# Patient Record
Sex: MALE | Race: WHITE | NOT HISPANIC OR LATINO
[De-identification: names, ages, dates, MRNs, and addresses within clinical notes are randomized per-mention and may not be internally consistent; named-entity substitution may affect disease eponyms.]

---

## 2020-03-14 ENCOUNTER — TRANSCRIPTION ENCOUNTER (OUTPATIENT)
Age: 60
End: 2020-03-14

## 2022-12-08 ENCOUNTER — OFFICE (OUTPATIENT)
Dept: URBAN - METROPOLITAN AREA CLINIC 109 | Facility: CLINIC | Age: 62
Setting detail: OPHTHALMOLOGY
End: 2022-12-08
Payer: COMMERCIAL

## 2022-12-08 VITALS — HEIGHT: 60 IN

## 2022-12-08 DIAGNOSIS — H40.013: ICD-10-CM

## 2022-12-08 DIAGNOSIS — H02.834: ICD-10-CM

## 2022-12-08 DIAGNOSIS — H25.13: ICD-10-CM

## 2022-12-08 DIAGNOSIS — H16.223: ICD-10-CM

## 2022-12-08 DIAGNOSIS — H02.831: ICD-10-CM

## 2022-12-08 PROCEDURE — 92014 COMPRE OPH EXAM EST PT 1/>: CPT | Performed by: OPHTHALMOLOGY

## 2022-12-08 PROCEDURE — 92133 CPTRZD OPH DX IMG PST SGM ON: CPT | Performed by: OPHTHALMOLOGY

## 2022-12-08 PROCEDURE — 92083 EXTENDED VISUAL FIELD XM: CPT | Performed by: OPHTHALMOLOGY

## 2022-12-08 ASSESSMENT — AXIALLENGTH_DERIVED
OD_AL: 24.32
OS_AL: 24.62

## 2022-12-08 ASSESSMENT — SPHEQUIV_DERIVED
OD_SPHEQUIV: -3.625
OS_SPHEQUIV: -3.875

## 2022-12-08 ASSESSMENT — VISUAL ACUITY
OS_BCVA: 20/20
OD_BCVA: 20/20

## 2022-12-08 ASSESSMENT — REFRACTION_CURRENTRX
OS_ADD: +2.50
OD_SPHERE: -2.75
OS_VPRISM_DIRECTION: PROGS
OS_SPHERE: -3.50
OD_ADD: +2.50
OD_AXIS: 125
OS_CYLINDER: -0.50
OS_CYLINDER: -1.00
OS_SPHERE: -3.75
OS_AXIS: 160
OD_ADD: +2.00
OD_OVR_VA: 20/
OS_OVR_VA: 20/
OD_OVR_VA: 20/
OD_CYLINDER: -1.25
OD_AXIS: 120
OS_OVR_VA: 20/
OD_VPRISM_DIRECTION: PROGS
OS_AXIS: 175
OD_CYLINDER: -0.75
OD_SPHERE: -3.25
OS_ADD: +2.00

## 2022-12-08 ASSESSMENT — REFRACTION_AUTOREFRACTION
OS_SPHERE: -3.50
OD_AXIS: 121
OD_CYLINDER: -0.25
OD_SPHERE: -3.50
OS_CYLINDER: -0.75
OS_AXIS: 140

## 2022-12-08 ASSESSMENT — CONFRONTATIONAL VISUAL FIELD TEST (CVF)
OS_FINDINGS: FULL
OD_FINDINGS: FULL

## 2022-12-08 ASSESSMENT — TEAR BREAK UP TIME (TBUT)
OS_TBUT: 1+
OD_TBUT: 1+

## 2022-12-08 ASSESSMENT — LID EXAM ASSESSMENTS
OD_MEIBOMITIS: RLL RUL 1+
OS_BLEPHARITIS: LLL LUL 1+
OS_MEIBOMITIS: LLL LUL 1+
OD_BLEPHARITIS: RLL RUL 1+

## 2022-12-08 ASSESSMENT — KERATOMETRY
OS_AXISANGLE_DEGREES: 87
OS_K1POWER_DIOPTERS: 44.37
OD_AXISANGLE_DEGREES: 88
OD_K2POWER_DIOPTERS: 46.00
OS_K2POWER_DIOPTERS: 45.50
OD_K1POWER_DIOPTERS: 44.87

## 2022-12-08 ASSESSMENT — REFRACTION_MANIFEST
OD_ADD: +2.50
OS_ADD: +2.50

## 2022-12-08 ASSESSMENT — LID POSITION - DERMATOCHALASIS
OS_DERMATOCHALASIS: LUL 2+
OD_DERMATOCHALASIS: RUL 2+

## 2023-12-07 ENCOUNTER — OFFICE (OUTPATIENT)
Dept: URBAN - METROPOLITAN AREA CLINIC 109 | Facility: CLINIC | Age: 63
Setting detail: OPHTHALMOLOGY
End: 2023-12-07
Payer: MEDICARE

## 2023-12-07 DIAGNOSIS — H40.013: ICD-10-CM

## 2023-12-07 DIAGNOSIS — H16.223: ICD-10-CM

## 2023-12-07 DIAGNOSIS — H02.831: ICD-10-CM

## 2023-12-07 DIAGNOSIS — H25.13: ICD-10-CM

## 2023-12-07 DIAGNOSIS — H02.834: ICD-10-CM

## 2023-12-07 PROCEDURE — 92083 EXTENDED VISUAL FIELD XM: CPT | Performed by: OPHTHALMOLOGY

## 2023-12-07 PROCEDURE — 92133 CPTRZD OPH DX IMG PST SGM ON: CPT | Performed by: OPHTHALMOLOGY

## 2023-12-07 PROCEDURE — 92014 COMPRE OPH EXAM EST PT 1/>: CPT | Performed by: OPHTHALMOLOGY

## 2023-12-07 ASSESSMENT — REFRACTION_AUTOREFRACTION
OD_CYLINDER: -0.25
OS_SPHERE: -3.50
OD_SPHERE: -3.50
OS_AXIS: 140
OS_CYLINDER: -0.75
OD_AXIS: 121

## 2023-12-07 ASSESSMENT — LID EXAM ASSESSMENTS
OS_BLEPHARITIS: LLL LUL 1+
OD_BLEPHARITIS: RLL RUL 1+
OD_MEIBOMITIS: RLL RUL 1+
OS_MEIBOMITIS: LLL LUL 1+

## 2023-12-07 ASSESSMENT — TEAR BREAK UP TIME (TBUT)
OS_TBUT: 1+
OD_TBUT: 1+

## 2023-12-07 ASSESSMENT — SPHEQUIV_DERIVED
OS_SPHEQUIV: -3.875
OD_SPHEQUIV: -3.625

## 2023-12-07 ASSESSMENT — LID POSITION - DERMATOCHALASIS
OS_DERMATOCHALASIS: LUL 2+
OD_DERMATOCHALASIS: RUL 2+

## 2023-12-07 ASSESSMENT — REFRACTION_CURRENTRX
OD_ADD: +2.00
OD_VPRISM_DIRECTION: PROGS
OD_CYLINDER: -1.25
OS_SPHERE: -3.50
OS_ADD: +2.50
OS_OVR_VA: 20/
OD_AXIS: 125
OS_SPHERE: -3.75
OD_CYLINDER: -0.75
OD_SPHERE: -2.75
OD_OVR_VA: 20/
OS_AXIS: 160
OS_ADD: +2.00
OS_CYLINDER: -0.50
OS_OVR_VA: 20/
OD_OVR_VA: 20/
OD_SPHERE: -3.25
OD_ADD: +2.50
OD_AXIS: 120
OS_VPRISM_DIRECTION: PROGS
OS_CYLINDER: -1.00
OS_AXIS: 175

## 2023-12-07 ASSESSMENT — REFRACTION_MANIFEST
OD_ADD: +2.50
OS_ADD: +2.50

## 2024-01-01 ENCOUNTER — EMERGENCY (EMERGENCY)
Facility: HOSPITAL | Age: 64
LOS: 1 days | Discharge: HOME | End: 2024-01-01
Attending: EMERGENCY MEDICINE | Admitting: EMERGENCY MEDICINE
Payer: MEDICARE

## 2024-01-01 ENCOUNTER — INPATIENT (INPATIENT)
Facility: HOSPITAL | Age: 64
LOS: 4 days | DRG: 392 | End: 2024-08-24
Attending: SPECIALIST | Admitting: INTERNAL MEDICINE
Payer: MEDICARE

## 2024-01-01 ENCOUNTER — OUTPATIENT (OUTPATIENT)
Dept: OUTPATIENT SERVICES | Facility: HOSPITAL | Age: 64
LOS: 1 days | Discharge: HOME | End: 2024-01-01
Payer: MEDICARE

## 2024-01-01 VITALS
WEIGHT: 158.73 LBS | SYSTOLIC BLOOD PRESSURE: 121 MMHG | OXYGEN SATURATION: 98 % | DIASTOLIC BLOOD PRESSURE: 80 MMHG | RESPIRATION RATE: 20 BRPM | TEMPERATURE: 99 F | HEIGHT: 70 IN | HEART RATE: 116 BPM

## 2024-01-01 VITALS — HEART RATE: 80 BPM | OXYGEN SATURATION: 94 % | RESPIRATION RATE: 23 BRPM

## 2024-01-01 VITALS
HEIGHT: 70 IN | DIASTOLIC BLOOD PRESSURE: 70 MMHG | OXYGEN SATURATION: 96 % | HEART RATE: 95 BPM | RESPIRATION RATE: 18 BRPM | TEMPERATURE: 99 F | SYSTOLIC BLOOD PRESSURE: 101 MMHG

## 2024-01-01 VITALS
SYSTOLIC BLOOD PRESSURE: 105 MMHG | DIASTOLIC BLOOD PRESSURE: 70 MMHG | RESPIRATION RATE: 19 BRPM | OXYGEN SATURATION: 98 % | HEART RATE: 78 BPM | TEMPERATURE: 99 F

## 2024-01-01 DIAGNOSIS — C20 MALIGNANT NEOPLASM OF RECTUM: ICD-10-CM

## 2024-01-01 DIAGNOSIS — R19.7 DIARRHEA, UNSPECIFIED: ICD-10-CM

## 2024-01-01 DIAGNOSIS — R94.31 ABNORMAL ELECTROCARDIOGRAM [ECG] [EKG]: ICD-10-CM

## 2024-01-01 DIAGNOSIS — E87.1 HYPO-OSMOLALITY AND HYPONATREMIA: ICD-10-CM

## 2024-01-01 DIAGNOSIS — K52.9 NONINFECTIVE GASTROENTERITIS AND COLITIS, UNSPECIFIED: ICD-10-CM

## 2024-01-01 LAB
ADD ON TEST-SPECIMEN IN LAB: SIGNIFICANT CHANGE UP
ALBUMIN SERPL ELPH-MCNC: 1.3 G/DL — LOW (ref 3.3–5)
ALBUMIN SERPL ELPH-MCNC: 1.8 G/DL — LOW (ref 3.3–5)
ALBUMIN SERPL ELPH-MCNC: 2 G/DL — LOW (ref 3.3–5)
ALBUMIN SERPL ELPH-MCNC: 2.5 G/DL — LOW (ref 3.3–5)
ALBUMIN SERPL ELPH-MCNC: 2.9 G/DL — LOW (ref 3.3–5)
ALBUMIN SERPL ELPH-MCNC: 2.9 G/DL — LOW (ref 3.3–5)
ALP SERPL-CCNC: 46 U/L — SIGNIFICANT CHANGE UP (ref 30–120)
ALP SERPL-CCNC: 47 U/L — SIGNIFICANT CHANGE UP (ref 40–120)
ALP SERPL-CCNC: 58 U/L — SIGNIFICANT CHANGE UP (ref 40–120)
ALP SERPL-CCNC: 75 U/L — SIGNIFICANT CHANGE UP (ref 40–120)
ALP SERPL-CCNC: 80 U/L — SIGNIFICANT CHANGE UP (ref 40–120)
ALP SERPL-CCNC: 91 U/L — SIGNIFICANT CHANGE UP (ref 40–120)
ALT FLD-CCNC: 11 U/L — SIGNIFICANT CHANGE UP (ref 10–45)
ALT FLD-CCNC: 19 U/L — SIGNIFICANT CHANGE UP (ref 10–60)
ALT FLD-CCNC: 31 U/L — SIGNIFICANT CHANGE UP (ref 10–45)
ALT FLD-CCNC: 33 U/L — SIGNIFICANT CHANGE UP (ref 10–45)
ALT FLD-CCNC: 37 U/L — SIGNIFICANT CHANGE UP (ref 10–45)
ALT FLD-CCNC: 39 U/L — SIGNIFICANT CHANGE UP (ref 10–45)
ANION GAP SERPL CALC-SCNC: 11 MMOL/L — SIGNIFICANT CHANGE UP (ref 5–17)
ANION GAP SERPL CALC-SCNC: 16 MMOL/L — SIGNIFICANT CHANGE UP (ref 5–17)
ANION GAP SERPL CALC-SCNC: 16 MMOL/L — SIGNIFICANT CHANGE UP (ref 5–17)
ANION GAP SERPL CALC-SCNC: 17 MMOL/L — SIGNIFICANT CHANGE UP (ref 5–17)
ANION GAP SERPL CALC-SCNC: 20 MMOL/L — HIGH (ref 5–17)
ANION GAP SERPL CALC-SCNC: 22 MMOL/L — HIGH (ref 5–17)
ANION GAP SERPL CALC-SCNC: 23 MMOL/L — HIGH (ref 5–17)
ANION GAP SERPL CALC-SCNC: 23 MMOL/L — HIGH (ref 5–17)
ANION GAP SERPL CALC-SCNC: 9 MMOL/L — SIGNIFICANT CHANGE UP (ref 5–17)
ANISOCYTOSIS BLD QL: SLIGHT — SIGNIFICANT CHANGE UP
ANISOCYTOSIS BLD QL: SLIGHT — SIGNIFICANT CHANGE UP
APPEARANCE UR: ABNORMAL
APPEARANCE UR: CLEAR — SIGNIFICANT CHANGE UP
APPEARANCE UR: CLEAR — SIGNIFICANT CHANGE UP
APTT BLD: 76.1 SEC — HIGH (ref 24.5–35.6)
AST SERPL-CCNC: 12 U/L — SIGNIFICANT CHANGE UP (ref 10–40)
AST SERPL-CCNC: 20 U/L — SIGNIFICANT CHANGE UP (ref 10–40)
AST SERPL-CCNC: 30 U/L — SIGNIFICANT CHANGE UP (ref 10–40)
AST SERPL-CCNC: 36 U/L — SIGNIFICANT CHANGE UP (ref 10–40)
AST SERPL-CCNC: 42 U/L — HIGH (ref 10–40)
AST SERPL-CCNC: 64 U/L — HIGH (ref 10–40)
B-OH-BUTYR SERPL-SCNC: 0.7 MMOL/L — HIGH
BACTERIA # UR AUTO: ABNORMAL /HPF
BACTERIA # UR AUTO: NEGATIVE /HPF — SIGNIFICANT CHANGE UP
BACTERIA # UR AUTO: NEGATIVE /HPF — SIGNIFICANT CHANGE UP
BASE EXCESS BLDV CALC-SCNC: -21.3 MMOL/L — LOW (ref -2–3)
BASOPHILS # BLD AUTO: 0 K/UL — SIGNIFICANT CHANGE UP (ref 0–0.2)
BASOPHILS # BLD AUTO: 0.02 K/UL — SIGNIFICANT CHANGE UP (ref 0–0.2)
BASOPHILS # BLD AUTO: 0.02 K/UL — SIGNIFICANT CHANGE UP (ref 0–0.2)
BASOPHILS # BLD AUTO: 0.03 K/UL — SIGNIFICANT CHANGE UP (ref 0–0.2)
BASOPHILS NFR BLD AUTO: 0 % — SIGNIFICANT CHANGE UP (ref 0–2)
BASOPHILS NFR BLD AUTO: 0.2 % — SIGNIFICANT CHANGE UP (ref 0–2)
BASOPHILS NFR BLD AUTO: 0.2 % — SIGNIFICANT CHANGE UP (ref 0–2)
BASOPHILS NFR BLD AUTO: 0.5 % — SIGNIFICANT CHANGE UP (ref 0–2)
BILIRUB SERPL-MCNC: 0.7 MG/DL — SIGNIFICANT CHANGE UP (ref 0.2–1.2)
BILIRUB SERPL-MCNC: 0.8 MG/DL — SIGNIFICANT CHANGE UP (ref 0.2–1.2)
BILIRUB SERPL-MCNC: 0.9 MG/DL — SIGNIFICANT CHANGE UP (ref 0.2–1.2)
BILIRUB SERPL-MCNC: 1.6 MG/DL — HIGH (ref 0.2–1.2)
BILIRUB SERPL-MCNC: 1.6 MG/DL — HIGH (ref 0.2–1.2)
BILIRUB SERPL-MCNC: 2.1 MG/DL — HIGH (ref 0.2–1.2)
BILIRUB UR-MCNC: ABNORMAL
BILIRUB UR-MCNC: ABNORMAL
BILIRUB UR-MCNC: NEGATIVE — SIGNIFICANT CHANGE UP
BLD GP AB SCN SERPL QL: NEGATIVE — SIGNIFICANT CHANGE UP
BUN SERPL-MCNC: 24 MG/DL — HIGH (ref 7–23)
BUN SERPL-MCNC: 33 MG/DL — HIGH (ref 7–23)
BUN SERPL-MCNC: 34 MG/DL — HIGH (ref 7–23)
BUN SERPL-MCNC: 34 MG/DL — HIGH (ref 7–23)
BUN SERPL-MCNC: 38 MG/DL — HIGH (ref 7–23)
BUN SERPL-MCNC: 41 MG/DL — HIGH (ref 7–23)
BUN SERPL-MCNC: 48 MG/DL — HIGH (ref 7–23)
BUN SERPL-MCNC: 50 MG/DL — HIGH (ref 7–23)
BUN SERPL-MCNC: 51 MG/DL — HIGH (ref 7–23)
BURR CELLS BLD QL SMEAR: PRESENT — SIGNIFICANT CHANGE UP
C DIFF GDH STL QL: NEGATIVE — SIGNIFICANT CHANGE UP
C DIFF GDH STL QL: SIGNIFICANT CHANGE UP
CALCIUM SERPL-MCNC: 7.4 MG/DL — LOW (ref 8.4–10.5)
CALCIUM SERPL-MCNC: 7.5 MG/DL — LOW (ref 8.4–10.5)
CALCIUM SERPL-MCNC: 7.5 MG/DL — LOW (ref 8.4–10.5)
CALCIUM SERPL-MCNC: 7.7 MG/DL — LOW (ref 8.4–10.5)
CALCIUM SERPL-MCNC: 7.9 MG/DL — LOW (ref 8.4–10.5)
CALCIUM SERPL-MCNC: 8.3 MG/DL — LOW (ref 8.4–10.5)
CALCIUM SERPL-MCNC: 8.3 MG/DL — LOW (ref 8.4–10.5)
CALCIUM SERPL-MCNC: 8.7 MG/DL — SIGNIFICANT CHANGE UP (ref 8.4–10.5)
CALCIUM SERPL-MCNC: 9 MG/DL — SIGNIFICANT CHANGE UP (ref 8.4–10.5)
CAST: 18 /LPF — HIGH (ref 0–4)
CAST: >63 /LPF — HIGH (ref 0–4)
CHLORIDE SERPL-SCNC: 100 MMOL/L — SIGNIFICANT CHANGE UP (ref 96–108)
CHLORIDE SERPL-SCNC: 101 MMOL/L — SIGNIFICANT CHANGE UP (ref 96–108)
CHLORIDE SERPL-SCNC: 95 MMOL/L — LOW (ref 96–108)
CHLORIDE SERPL-SCNC: 95 MMOL/L — LOW (ref 96–108)
CHLORIDE SERPL-SCNC: 96 MMOL/L — SIGNIFICANT CHANGE UP (ref 96–108)
CHLORIDE SERPL-SCNC: 97 MMOL/L — SIGNIFICANT CHANGE UP (ref 96–108)
CHLORIDE SERPL-SCNC: 97 MMOL/L — SIGNIFICANT CHANGE UP (ref 96–108)
CHLORIDE SERPL-SCNC: 98 MMOL/L — SIGNIFICANT CHANGE UP (ref 96–108)
CHLORIDE SERPL-SCNC: 99 MMOL/L — SIGNIFICANT CHANGE UP (ref 96–108)
CK MB CFR SERPL CALC: 2.9 NG/ML — SIGNIFICANT CHANGE UP (ref 0–6.7)
CK MB CFR SERPL CALC: 8.3 NG/ML — HIGH (ref 0–6.7)
CO2 BLDV-SCNC: 9 MMOL/L — CRITICAL LOW (ref 22–26)
CO2 SERPL-SCNC: 10 MMOL/L — CRITICAL LOW (ref 22–31)
CO2 SERPL-SCNC: 11 MMOL/L — LOW (ref 22–31)
CO2 SERPL-SCNC: 13 MMOL/L — LOW (ref 22–31)
CO2 SERPL-SCNC: 16 MMOL/L — LOW (ref 22–31)
CO2 SERPL-SCNC: 18 MMOL/L — LOW (ref 22–31)
CO2 SERPL-SCNC: 18 MMOL/L — LOW (ref 22–31)
CO2 SERPL-SCNC: 20 MMOL/L — LOW (ref 22–31)
CO2 SERPL-SCNC: 25 MMOL/L — SIGNIFICANT CHANGE UP (ref 22–31)
CO2 SERPL-SCNC: <10 MMOL/L — CRITICAL LOW (ref 22–31)
COLOR SPEC: SIGNIFICANT CHANGE UP
CREAT ?TM UR-MCNC: 197 MG/DL — SIGNIFICANT CHANGE UP
CREAT SERPL-MCNC: 1.16 MG/DL — SIGNIFICANT CHANGE UP (ref 0.5–1.3)
CREAT SERPL-MCNC: 1.2 MG/DL — SIGNIFICANT CHANGE UP (ref 0.5–1.3)
CREAT SERPL-MCNC: 1.21 MG/DL — SIGNIFICANT CHANGE UP (ref 0.5–1.3)
CREAT SERPL-MCNC: 1.65 MG/DL — HIGH (ref 0.5–1.3)
CREAT SERPL-MCNC: 1.73 MG/DL — HIGH (ref 0.5–1.3)
CREAT SERPL-MCNC: 1.77 MG/DL — HIGH (ref 0.5–1.3)
CREAT SERPL-MCNC: 2.59 MG/DL — HIGH (ref 0.5–1.3)
CREAT SERPL-MCNC: 2.59 MG/DL — HIGH (ref 0.5–1.3)
CREAT SERPL-MCNC: 2.66 MG/DL — HIGH (ref 0.5–1.3)
DACRYOCYTES BLD QL SMEAR: SLIGHT — SIGNIFICANT CHANGE UP
DIFF PNL FLD: ABNORMAL
DIFF PNL FLD: NEGATIVE — SIGNIFICANT CHANGE UP
DIFF PNL FLD: NEGATIVE — SIGNIFICANT CHANGE UP
E FAECIUM DNA BLD POS QL NAA+NON-PROBE: SIGNIFICANT CHANGE UP
EGFR: 26 ML/MIN/1.73M2 — LOW
EGFR: 42 ML/MIN/1.73M2 — LOW
EGFR: 43 ML/MIN/1.73M2 — LOW
EGFR: 46 ML/MIN/1.73M2 — LOW
EGFR: 66 ML/MIN/1.73M2 — SIGNIFICANT CHANGE UP
EGFR: 67 ML/MIN/1.73M2 — SIGNIFICANT CHANGE UP
EGFR: 69 ML/MIN/1.73M2 — SIGNIFICANT CHANGE UP
ELLIPTOCYTES BLD QL SMEAR: SLIGHT — SIGNIFICANT CHANGE UP
ELLIPTOCYTES BLD QL SMEAR: SLIGHT — SIGNIFICANT CHANGE UP
EOSINOPHIL # BLD AUTO: 0 K/UL — SIGNIFICANT CHANGE UP (ref 0–0.5)
EOSINOPHIL # BLD AUTO: 0.01 K/UL — SIGNIFICANT CHANGE UP (ref 0–0.5)
EOSINOPHIL NFR BLD AUTO: 0 % — SIGNIFICANT CHANGE UP (ref 0–6)
EOSINOPHIL NFR BLD AUTO: 0.1 % — SIGNIFICANT CHANGE UP (ref 0–6)
EPI CELLS # UR: PRESENT
FLUAV AG NPH QL: SIGNIFICANT CHANGE UP
FLUBV AG NPH QL: SIGNIFICANT CHANGE UP
GAS PNL BLDA: SIGNIFICANT CHANGE UP
GAS PNL BLDV: SIGNIFICANT CHANGE UP
GI PCR PANEL: SIGNIFICANT CHANGE UP
GIANT PLATELETS BLD QL SMEAR: PRESENT — SIGNIFICANT CHANGE UP
GLUCOSE BLDC GLUCOMTR-MCNC: 154 MG/DL — HIGH (ref 70–99)
GLUCOSE BLDC GLUCOMTR-MCNC: 163 MG/DL — HIGH (ref 70–99)
GLUCOSE SERPL-MCNC: 118 MG/DL — HIGH (ref 70–99)
GLUCOSE SERPL-MCNC: 134 MG/DL — HIGH (ref 70–99)
GLUCOSE SERPL-MCNC: 138 MG/DL — HIGH (ref 70–99)
GLUCOSE SERPL-MCNC: 156 MG/DL — HIGH (ref 70–99)
GLUCOSE SERPL-MCNC: 159 MG/DL — HIGH (ref 70–99)
GLUCOSE SERPL-MCNC: 171 MG/DL — HIGH (ref 70–99)
GLUCOSE SERPL-MCNC: 185 MG/DL — HIGH (ref 70–99)
GLUCOSE SERPL-MCNC: 187 MG/DL — HIGH (ref 70–99)
GLUCOSE SERPL-MCNC: 256 MG/DL — HIGH (ref 70–99)
GLUCOSE UR QL: NEGATIVE MG/DL — SIGNIFICANT CHANGE UP
GRAM STN FLD: ABNORMAL
HCO3 BLDV-SCNC: 8 MMOL/L — CRITICAL LOW (ref 22–29)
HCT VFR BLD CALC: 33.1 % — LOW (ref 39–50)
HCT VFR BLD CALC: 38.9 % — LOW (ref 39–50)
HCT VFR BLD CALC: 40.1 % — SIGNIFICANT CHANGE UP (ref 39–50)
HCT VFR BLD CALC: 42.2 % — SIGNIFICANT CHANGE UP (ref 39–50)
HCT VFR BLD CALC: 46.1 % — SIGNIFICANT CHANGE UP (ref 39–50)
HCT VFR BLD CALC: 47 % — SIGNIFICANT CHANGE UP (ref 39–50)
HCT VFR BLD CALC: 47.2 % — SIGNIFICANT CHANGE UP (ref 39–50)
HCT VFR BLD CALC: 49.2 % — SIGNIFICANT CHANGE UP (ref 39–50)
HGB BLD-MCNC: 10.3 G/DL — LOW (ref 13–17)
HGB BLD-MCNC: 12 G/DL — LOW (ref 13–17)
HGB BLD-MCNC: 12.8 G/DL — LOW (ref 13–17)
HGB BLD-MCNC: 13.9 G/DL — SIGNIFICANT CHANGE UP (ref 13–17)
HGB BLD-MCNC: 14 G/DL — SIGNIFICANT CHANGE UP (ref 13–17)
HGB BLD-MCNC: 14.9 G/DL — SIGNIFICANT CHANGE UP (ref 13–17)
HGB BLD-MCNC: 15 G/DL — SIGNIFICANT CHANGE UP (ref 13–17)
HGB BLD-MCNC: 15 G/DL — SIGNIFICANT CHANGE UP (ref 13–17)
HYALINE CASTS # UR AUTO: PRESENT
HYPOCHROMIA BLD QL: SLIGHT — SIGNIFICANT CHANGE UP
IMM GRANULOCYTES NFR BLD AUTO: 1 % — HIGH (ref 0–0.9)
IMM GRANULOCYTES NFR BLD AUTO: 1.8 % — HIGH (ref 0–0.9)
IMM GRANULOCYTES NFR BLD AUTO: 3.4 % — HIGH (ref 0–0.9)
INR BLD: 5.91 RATIO — CRITICAL HIGH (ref 0.85–1.18)
KETONES UR-MCNC: ABNORMAL MG/DL
KETONES UR-MCNC: ABNORMAL MG/DL
KETONES UR-MCNC: NEGATIVE MG/DL — SIGNIFICANT CHANGE UP
LACTATE SERPL-SCNC: 1.3 MMOL/L — SIGNIFICANT CHANGE UP (ref 0.5–2)
LEUKOCYTE ESTERASE UR-ACNC: ABNORMAL
LEUKOCYTE ESTERASE UR-ACNC: NEGATIVE — SIGNIFICANT CHANGE UP
LEUKOCYTE ESTERASE UR-ACNC: NEGATIVE — SIGNIFICANT CHANGE UP
LG PLATELETS BLD QL AUTO: SLIGHT — SIGNIFICANT CHANGE UP
LG PLATELETS BLD QL AUTO: SLIGHT — SIGNIFICANT CHANGE UP
LIDOCAIN IGE QN: 37 U/L — SIGNIFICANT CHANGE UP (ref 16–77)
LYMPHOCYTES # BLD AUTO: 0.67 K/UL — LOW (ref 1–3.3)
LYMPHOCYTES # BLD AUTO: 0.7 K/UL — LOW (ref 1–3.3)
LYMPHOCYTES # BLD AUTO: 0.82 K/UL — LOW (ref 1–3.3)
LYMPHOCYTES # BLD AUTO: 0.97 K/UL — LOW (ref 1–3.3)
LYMPHOCYTES # BLD AUTO: 1.19 K/UL — SIGNIFICANT CHANGE UP (ref 1–3.3)
LYMPHOCYTES # BLD AUTO: 14.9 % — SIGNIFICANT CHANGE UP (ref 13–44)
LYMPHOCYTES # BLD AUTO: 2.38 K/UL — SIGNIFICANT CHANGE UP (ref 1–3.3)
LYMPHOCYTES # BLD AUTO: 26 % — SIGNIFICANT CHANGE UP (ref 13–44)
LYMPHOCYTES # BLD AUTO: 49.5 % — HIGH (ref 13–44)
LYMPHOCYTES # BLD AUTO: 8.8 % — LOW (ref 13–44)
LYMPHOCYTES # BLD AUTO: 9 % — LOW (ref 13–44)
LYMPHOCYTES # BLD AUTO: 9 % — LOW (ref 13–44)
MAGNESIUM SERPL-MCNC: 2.4 MG/DL — SIGNIFICANT CHANGE UP (ref 1.6–2.6)
MAGNESIUM SERPL-MCNC: 3.8 MG/DL — HIGH (ref 1.6–2.6)
MAGNESIUM SERPL-MCNC: 3.9 MG/DL — HIGH (ref 1.6–2.6)
MAGNESIUM SERPL-MCNC: 4.5 MG/DL — HIGH (ref 1.6–2.6)
MANUAL SMEAR VERIFICATION: SIGNIFICANT CHANGE UP
MCHC RBC-ENTMCNC: 22.1 PG — LOW (ref 27–34)
MCHC RBC-ENTMCNC: 22.1 PG — LOW (ref 27–34)
MCHC RBC-ENTMCNC: 22.2 PG — LOW (ref 27–34)
MCHC RBC-ENTMCNC: 22.5 PG — LOW (ref 27–34)
MCHC RBC-ENTMCNC: 22.7 PG — LOW (ref 27–34)
MCHC RBC-ENTMCNC: 22.8 PG — LOW (ref 27–34)
MCHC RBC-ENTMCNC: 22.9 PG — LOW (ref 27–34)
MCHC RBC-ENTMCNC: 23.1 PG — LOW (ref 27–34)
MCHC RBC-ENTMCNC: 29.9 GM/DL — LOW (ref 32–36)
MCHC RBC-ENTMCNC: 30.2 GM/DL — LOW (ref 32–36)
MCHC RBC-ENTMCNC: 30.5 GM/DL — LOW (ref 32–36)
MCHC RBC-ENTMCNC: 31.1 GM/DL — LOW (ref 32–36)
MCHC RBC-ENTMCNC: 31.6 GM/DL — LOW (ref 32–36)
MCHC RBC-ENTMCNC: 31.9 GM/DL — LOW (ref 32–36)
MCHC RBC-ENTMCNC: 32.9 GM/DL — SIGNIFICANT CHANGE UP (ref 32–36)
MCHC RBC-ENTMCNC: 33.2 GM/DL — SIGNIFICANT CHANGE UP (ref 32–36)
MCV RBC AUTO: 68.4 FL — LOW (ref 80–100)
MCV RBC AUTO: 69.3 FL — LOW (ref 80–100)
MCV RBC AUTO: 69.6 FL — LOW (ref 80–100)
MCV RBC AUTO: 70.1 FL — LOW (ref 80–100)
MCV RBC AUTO: 72.5 FL — LOW (ref 80–100)
MCV RBC AUTO: 74.4 FL — LOW (ref 80–100)
MCV RBC AUTO: 74.6 FL — LOW (ref 80–100)
MCV RBC AUTO: 76.7 FL — LOW (ref 80–100)
METAMYELOCYTES # FLD: 10 % — HIGH (ref 0–0)
METAMYELOCYTES # FLD: 11.7 % — HIGH (ref 0–0)
METHOD TYPE: SIGNIFICANT CHANGE UP
MICROCYTES BLD QL: SLIGHT — SIGNIFICANT CHANGE UP
MICROCYTES BLD QL: SLIGHT — SIGNIFICANT CHANGE UP
MONOCYTES # BLD AUTO: 0.19 K/UL — SIGNIFICANT CHANGE UP (ref 0–0.9)
MONOCYTES # BLD AUTO: 0.21 K/UL — SIGNIFICANT CHANGE UP (ref 0–0.9)
MONOCYTES # BLD AUTO: 0.64 K/UL — SIGNIFICANT CHANGE UP (ref 0–0.9)
MONOCYTES # BLD AUTO: 0.74 K/UL — SIGNIFICANT CHANGE UP (ref 0–0.9)
MONOCYTES # BLD AUTO: 0.96 K/UL — HIGH (ref 0–0.9)
MONOCYTES # BLD AUTO: 1.47 K/UL — HIGH (ref 0–0.9)
MONOCYTES NFR BLD AUTO: 10.3 % — SIGNIFICANT CHANGE UP (ref 2–14)
MONOCYTES NFR BLD AUTO: 15.5 % — HIGH (ref 2–14)
MONOCYTES NFR BLD AUTO: 19 % — HIGH (ref 2–14)
MONOCYTES NFR BLD AUTO: 2.9 % — SIGNIFICANT CHANGE UP (ref 2–14)
MONOCYTES NFR BLD AUTO: 4.8 % — SIGNIFICANT CHANGE UP (ref 2–14)
MONOCYTES NFR BLD AUTO: 8 % — SIGNIFICANT CHANGE UP (ref 2–14)
MYELOCYTES NFR BLD: 1 % — HIGH (ref 0–0)
MYELOCYTES NFR BLD: 10.7 % — HIGH (ref 0–0)
NEUTROPHILS # BLD AUTO: 0.6 K/UL — LOW (ref 1.8–7.4)
NEUTROPHILS # BLD AUTO: 1.6 K/UL — LOW (ref 1.8–7.4)
NEUTROPHILS # BLD AUTO: 10.93 K/UL — HIGH (ref 1.8–7.4)
NEUTROPHILS # BLD AUTO: 4.64 K/UL — SIGNIFICANT CHANGE UP (ref 1.8–7.4)
NEUTROPHILS # BLD AUTO: 5.22 K/UL — SIGNIFICANT CHANGE UP (ref 1.8–7.4)
NEUTROPHILS # BLD AUTO: 7.45 K/UL — HIGH (ref 1.8–7.4)
NEUTROPHILS NFR BLD AUTO: 1.9 % — LOW (ref 43–77)
NEUTROPHILS NFR BLD AUTO: 44 % — SIGNIFICANT CHANGE UP (ref 43–77)
NEUTROPHILS NFR BLD AUTO: 52 % — SIGNIFICANT CHANGE UP (ref 43–77)
NEUTROPHILS NFR BLD AUTO: 79.7 % — HIGH (ref 43–77)
NEUTROPHILS NFR BLD AUTO: 79.9 % — HIGH (ref 43–77)
NEUTROPHILS NFR BLD AUTO: 82.5 % — HIGH (ref 43–77)
NEUTS BAND # BLD: 10 % — HIGH (ref 0–8)
NEUTS BAND # BLD: 10.7 % — HIGH (ref 0–8)
NEUTS BAND # BLD: 16 % — HIGH (ref 0–8)
NITRITE UR-MCNC: NEGATIVE — SIGNIFICANT CHANGE UP
NITRITE UR-MCNC: NEGATIVE — SIGNIFICANT CHANGE UP
NITRITE UR-MCNC: POSITIVE
NRBC # BLD: 0 /100 WBCS — SIGNIFICANT CHANGE UP (ref 0–0)
NRBC # BLD: 2 /100 WBCS — HIGH (ref 0–0)
NRBC # BLD: SIGNIFICANT CHANGE UP /100 WBCS (ref 0–0)
NT-PROBNP SERPL-SCNC: 464 PG/ML — HIGH (ref 0–300)
NT-PROBNP SERPL-SCNC: 628 PG/ML — HIGH (ref 0–300)
OSMOLALITY UR: 993 MOS/KG — HIGH (ref 300–900)
OVALOCYTES BLD QL SMEAR: SLIGHT — SIGNIFICANT CHANGE UP
PCO2 BLDV: 29 MMHG — LOW (ref 42–55)
PH BLDV: 7.05 — CRITICAL LOW (ref 7.32–7.43)
PH UR: 5.5 — SIGNIFICANT CHANGE UP (ref 5–8)
PH UR: 6 — SIGNIFICANT CHANGE UP (ref 5–8)
PH UR: 6 — SIGNIFICANT CHANGE UP (ref 5–8)
PHOSPHATE SERPL-MCNC: 11 MG/DL — HIGH (ref 2.5–4.5)
PHOSPHATE SERPL-MCNC: 11.6 MG/DL — HIGH (ref 2.5–4.5)
PHOSPHATE SERPL-MCNC: 5.7 MG/DL — HIGH (ref 2.5–4.5)
PHOSPHATE SERPL-MCNC: 8 MG/DL — HIGH (ref 2.5–4.5)
PLAT MORPH BLD: NORMAL — SIGNIFICANT CHANGE UP
PLATELET # BLD AUTO: 303 K/UL — SIGNIFICANT CHANGE UP (ref 150–400)
PLATELET # BLD AUTO: 306 K/UL — SIGNIFICANT CHANGE UP (ref 150–400)
PLATELET # BLD AUTO: 349 K/UL — SIGNIFICANT CHANGE UP (ref 150–400)
PLATELET # BLD AUTO: 432 K/UL — HIGH (ref 150–400)
PLATELET # BLD AUTO: 459 K/UL — HIGH (ref 150–400)
PLATELET # BLD AUTO: 468 K/UL — HIGH (ref 150–400)
PLATELET # BLD AUTO: 474 K/UL — HIGH (ref 150–400)
PLATELET # BLD AUTO: 494 K/UL — HIGH (ref 150–400)
PO2 BLDV: 245 MMHG — HIGH (ref 25–45)
POIKILOCYTOSIS BLD QL AUTO: SLIGHT — SIGNIFICANT CHANGE UP
POIKILOCYTOSIS BLD QL AUTO: SLIGHT — SIGNIFICANT CHANGE UP
POTASSIUM SERPL-MCNC: 3.7 MMOL/L — SIGNIFICANT CHANGE UP (ref 3.5–5.3)
POTASSIUM SERPL-MCNC: 3.7 MMOL/L — SIGNIFICANT CHANGE UP (ref 3.5–5.3)
POTASSIUM SERPL-MCNC: 4.1 MMOL/L — SIGNIFICANT CHANGE UP (ref 3.5–5.3)
POTASSIUM SERPL-MCNC: 4.1 MMOL/L — SIGNIFICANT CHANGE UP (ref 3.5–5.3)
POTASSIUM SERPL-MCNC: 4.4 MMOL/L — SIGNIFICANT CHANGE UP (ref 3.5–5.3)
POTASSIUM SERPL-MCNC: 4.6 MMOL/L — SIGNIFICANT CHANGE UP (ref 3.5–5.3)
POTASSIUM SERPL-MCNC: 4.7 MMOL/L — SIGNIFICANT CHANGE UP (ref 3.5–5.3)
POTASSIUM SERPL-MCNC: 5 MMOL/L — SIGNIFICANT CHANGE UP (ref 3.5–5.3)
POTASSIUM SERPL-MCNC: 6.7 MMOL/L — CRITICAL HIGH (ref 3.5–5.3)
POTASSIUM SERPL-SCNC: 3.7 MMOL/L — SIGNIFICANT CHANGE UP (ref 3.5–5.3)
POTASSIUM SERPL-SCNC: 3.7 MMOL/L — SIGNIFICANT CHANGE UP (ref 3.5–5.3)
POTASSIUM SERPL-SCNC: 4.1 MMOL/L — SIGNIFICANT CHANGE UP (ref 3.5–5.3)
POTASSIUM SERPL-SCNC: 4.1 MMOL/L — SIGNIFICANT CHANGE UP (ref 3.5–5.3)
POTASSIUM SERPL-SCNC: 4.4 MMOL/L — SIGNIFICANT CHANGE UP (ref 3.5–5.3)
POTASSIUM SERPL-SCNC: 4.6 MMOL/L — SIGNIFICANT CHANGE UP (ref 3.5–5.3)
POTASSIUM SERPL-SCNC: 4.7 MMOL/L — SIGNIFICANT CHANGE UP (ref 3.5–5.3)
POTASSIUM SERPL-SCNC: 5 MMOL/L — SIGNIFICANT CHANGE UP (ref 3.5–5.3)
POTASSIUM SERPL-SCNC: 6.7 MMOL/L — CRITICAL HIGH (ref 3.5–5.3)
POTASSIUM UR-SCNC: 52 MMOL/L — SIGNIFICANT CHANGE UP
PROMYELOCYTES # FLD: 1 % — HIGH (ref 0–0)
PROT ?TM UR-MCNC: 40 MG/DL — HIGH (ref 0–12)
PROT SERPL-MCNC: 2.9 G/DL — LOW (ref 6–8.3)
PROT SERPL-MCNC: 3.3 G/DL — LOW (ref 6–8.3)
PROT SERPL-MCNC: 4.9 G/DL — LOW (ref 6–8.3)
PROT SERPL-MCNC: 5.4 G/DL — LOW (ref 6–8.3)
PROT SERPL-MCNC: 5.6 G/DL — LOW (ref 6–8.3)
PROT SERPL-MCNC: 5.9 G/DL — LOW (ref 6–8.3)
PROT UR-MCNC: 30 MG/DL
PROT/CREAT UR-RTO: 0.2 RATIO — SIGNIFICANT CHANGE UP (ref 0–0.2)
PROTHROM AB SERPL-ACNC: 61.7 SEC — HIGH (ref 9.5–13)
RBC # BLD: 4.45 M/UL — SIGNIFICANT CHANGE UP (ref 4.2–5.8)
RBC # BLD: 5.23 M/UL — SIGNIFICANT CHANGE UP (ref 4.2–5.8)
RBC # BLD: 5.61 M/UL — SIGNIFICANT CHANGE UP (ref 4.2–5.8)
RBC # BLD: 6.17 M/UL — HIGH (ref 4.2–5.8)
RBC # BLD: 6.18 M/UL — HIGH (ref 4.2–5.8)
RBC # BLD: 6.73 M/UL — HIGH (ref 4.2–5.8)
RBC # BLD: 6.75 M/UL — HIGH (ref 4.2–5.8)
RBC # BLD: 6.79 M/UL — HIGH (ref 4.2–5.8)
RBC # FLD: 20.4 % — HIGH (ref 10.3–14.5)
RBC # FLD: 20.9 % — HIGH (ref 10.3–14.5)
RBC # FLD: 21 % — HIGH (ref 10.3–14.5)
RBC # FLD: 23 % — HIGH (ref 10.3–14.5)
RBC # FLD: 23.2 % — HIGH (ref 10.3–14.5)
RBC # FLD: 23.8 % — HIGH (ref 10.3–14.5)
RBC # FLD: 23.9 % — HIGH (ref 10.3–14.5)
RBC # FLD: 24 % — HIGH (ref 10.3–14.5)
RBC BLD AUTO: ABNORMAL
RBC CASTS # UR COMP ASSIST: 0 /HPF — SIGNIFICANT CHANGE UP (ref 0–4)
RBC CASTS # UR COMP ASSIST: 2 /HPF — SIGNIFICANT CHANGE UP (ref 0–4)
RBC CASTS # UR COMP ASSIST: 45 /HPF — HIGH (ref 0–4)
REVIEW: SIGNIFICANT CHANGE UP
REVIEW: SIGNIFICANT CHANGE UP
RH IG SCN BLD-IMP: POSITIVE — SIGNIFICANT CHANGE UP
RSV RNA NPH QL NAA+NON-PROBE: SIGNIFICANT CHANGE UP
SAO2 % BLDV: 95.4 % — HIGH (ref 67–88)
SARS-COV-2 RNA SPEC QL NAA+PROBE: SIGNIFICANT CHANGE UP
SMUDGE CELLS # BLD: PRESENT — SIGNIFICANT CHANGE UP
SODIUM SERPL-SCNC: 126 MMOL/L — LOW (ref 135–145)
SODIUM SERPL-SCNC: 128 MMOL/L — LOW (ref 135–145)
SODIUM SERPL-SCNC: 131 MMOL/L — LOW (ref 135–145)
SODIUM SERPL-SCNC: 132 MMOL/L — LOW (ref 135–145)
SODIUM SERPL-SCNC: 133 MMOL/L — LOW (ref 135–145)
SODIUM SERPL-SCNC: 134 MMOL/L — LOW (ref 135–145)
SODIUM SERPL-SCNC: 134 MMOL/L — LOW (ref 135–145)
SODIUM UR-SCNC: <5 MMOL/L — SIGNIFICANT CHANGE UP
SP GR SPEC: 1.04 — HIGH (ref 1–1.03)
SP GR SPEC: >1.03 — HIGH (ref 1–1.03)
SP GR SPEC: >1.03 — HIGH (ref 1–1.03)
SPECIMEN SOURCE: SIGNIFICANT CHANGE UP
SPECIMEN SOURCE: SIGNIFICANT CHANGE UP
SQUAMOUS # UR AUTO: 1 /HPF — SIGNIFICANT CHANGE UP (ref 0–5)
SQUAMOUS # UR AUTO: 8 /HPF — HIGH (ref 0–5)
SURGICAL PATHOLOGY STUDY: SIGNIFICANT CHANGE UP
TOXIC GRANULES BLD QL SMEAR: PRESENT — SIGNIFICANT CHANGE UP
TROPONIN I, HIGH SENSITIVITY RESULT: 5.5 NG/L — SIGNIFICANT CHANGE UP
TROPONIN T, HIGH SENSITIVITY RESULT: 23 NG/L — SIGNIFICANT CHANGE UP (ref 0–51)
TROPONIN T, HIGH SENSITIVITY RESULT: 62 NG/L — HIGH (ref 0–51)
UROBILINOGEN FLD QL: 0.2 MG/DL — SIGNIFICANT CHANGE UP (ref 0.2–1)
UROBILINOGEN FLD QL: 0.2 MG/DL — SIGNIFICANT CHANGE UP (ref 0.2–1)
UROBILINOGEN FLD QL: 1 MG/DL — SIGNIFICANT CHANGE UP (ref 0.2–1)
UUN UR-MCNC: 1789 MG/DL — SIGNIFICANT CHANGE UP
VARIANT LYMPHS # BLD: 4 % — SIGNIFICANT CHANGE UP (ref 0–6)
WBC # BLD: 13.24 K/UL — HIGH (ref 3.8–10.5)
WBC # BLD: 2.58 K/UL — LOW (ref 3.8–10.5)
WBC # BLD: 4.8 K/UL — SIGNIFICANT CHANGE UP (ref 3.8–10.5)
WBC # BLD: 5.79 K/UL — SIGNIFICANT CHANGE UP (ref 3.8–10.5)
WBC # BLD: 6.25 K/UL — SIGNIFICANT CHANGE UP (ref 3.8–10.5)
WBC # BLD: 6.53 K/UL — SIGNIFICANT CHANGE UP (ref 3.8–10.5)
WBC # BLD: 7.74 K/UL — SIGNIFICANT CHANGE UP (ref 3.8–10.5)
WBC # BLD: 9.34 K/UL — SIGNIFICANT CHANGE UP (ref 3.8–10.5)
WBC # FLD AUTO: 13.24 K/UL — HIGH (ref 3.8–10.5)
WBC # FLD AUTO: 2.58 K/UL — LOW (ref 3.8–10.5)
WBC # FLD AUTO: 4.8 K/UL — SIGNIFICANT CHANGE UP (ref 3.8–10.5)
WBC # FLD AUTO: 5.79 K/UL — SIGNIFICANT CHANGE UP (ref 3.8–10.5)
WBC # FLD AUTO: 6.25 K/UL — SIGNIFICANT CHANGE UP (ref 3.8–10.5)
WBC # FLD AUTO: 6.53 K/UL — SIGNIFICANT CHANGE UP (ref 3.8–10.5)
WBC # FLD AUTO: 7.74 K/UL — SIGNIFICANT CHANGE UP (ref 3.8–10.5)
WBC # FLD AUTO: 9.34 K/UL — SIGNIFICANT CHANGE UP (ref 3.8–10.5)
WBC UR QL: 0 /HPF — SIGNIFICANT CHANGE UP (ref 0–5)
WBC UR QL: 1 /HPF — SIGNIFICANT CHANGE UP (ref 0–5)
WBC UR QL: 3 /HPF — SIGNIFICANT CHANGE UP (ref 0–5)

## 2024-01-01 PROCEDURE — 99291 CRITICAL CARE FIRST HOUR: CPT | Mod: GC,25

## 2024-01-01 PROCEDURE — 71275 CT ANGIOGRAPHY CHEST: CPT | Mod: 26

## 2024-01-01 PROCEDURE — 87150 DNA/RNA AMPLIFIED PROBE: CPT

## 2024-01-01 PROCEDURE — 87324 CLOSTRIDIUM AG IA: CPT

## 2024-01-01 PROCEDURE — 84540 ASSAY OF URINE/UREA-N: CPT

## 2024-01-01 PROCEDURE — 81001 URINALYSIS AUTO W/SCOPE: CPT

## 2024-01-01 PROCEDURE — 43753 TX GASTRO INTUB W/ASP: CPT | Mod: 59,GC

## 2024-01-01 PROCEDURE — 84484 ASSAY OF TROPONIN QUANT: CPT

## 2024-01-01 PROCEDURE — 97161 PT EVAL LOW COMPLEX 20 MIN: CPT

## 2024-01-01 PROCEDURE — 99284 EMERGENCY DEPT VISIT MOD MDM: CPT

## 2024-01-01 PROCEDURE — 84100 ASSAY OF PHOSPHORUS: CPT

## 2024-01-01 PROCEDURE — 94002 VENT MGMT INPAT INIT DAY: CPT

## 2024-01-01 PROCEDURE — 87637 SARSCOV2&INF A&B&RSV AMP PRB: CPT

## 2024-01-01 PROCEDURE — 36556 INSERT NON-TUNNEL CV CATH: CPT | Mod: GC

## 2024-01-01 PROCEDURE — 36415 COLL VENOUS BLD VENIPUNCTURE: CPT

## 2024-01-01 PROCEDURE — 85027 COMPLETE CBC AUTOMATED: CPT

## 2024-01-01 PROCEDURE — 74177 CT ABD & PELVIS W/CONTRAST: CPT | Mod: 26

## 2024-01-01 PROCEDURE — 36620 INSERTION CATHETER ARTERY: CPT | Mod: GC

## 2024-01-01 PROCEDURE — 83735 ASSAY OF MAGNESIUM: CPT

## 2024-01-01 PROCEDURE — 88321 CONSLTJ&REPRT SLD PREP ELSWR: CPT

## 2024-01-01 PROCEDURE — 96374 THER/PROPH/DIAG INJ IV PUSH: CPT

## 2024-01-01 PROCEDURE — 85014 HEMATOCRIT: CPT

## 2024-01-01 PROCEDURE — 99285 EMERGENCY DEPT VISIT HI MDM: CPT

## 2024-01-01 PROCEDURE — 80048 BASIC METABOLIC PNL TOTAL CA: CPT

## 2024-01-01 PROCEDURE — 82435 ASSAY OF BLOOD CHLORIDE: CPT

## 2024-01-01 PROCEDURE — 97116 GAIT TRAINING THERAPY: CPT

## 2024-01-01 PROCEDURE — 71045 X-RAY EXAM CHEST 1 VIEW: CPT | Mod: 26

## 2024-01-01 PROCEDURE — 82947 ASSAY GLUCOSE BLOOD QUANT: CPT

## 2024-01-01 PROCEDURE — 82570 ASSAY OF URINE CREATININE: CPT

## 2024-01-01 PROCEDURE — 76604 US EXAM CHEST: CPT | Mod: 26,GC

## 2024-01-01 PROCEDURE — 87040 BLOOD CULTURE FOR BACTERIA: CPT

## 2024-01-01 PROCEDURE — 85025 COMPLETE CBC W/AUTO DIFF WBC: CPT

## 2024-01-01 PROCEDURE — 99223 1ST HOSP IP/OBS HIGH 75: CPT

## 2024-01-01 PROCEDURE — 93308 TTE F-UP OR LMTD: CPT | Mod: 26,GC

## 2024-01-01 PROCEDURE — 87449 NOS EACH ORGANISM AG IA: CPT

## 2024-01-01 PROCEDURE — 87507 IADNA-DNA/RNA PROBE TQ 12-25: CPT

## 2024-01-01 PROCEDURE — 87077 CULTURE AEROBIC IDENTIFY: CPT

## 2024-01-01 PROCEDURE — 84156 ASSAY OF PROTEIN URINE: CPT

## 2024-01-01 PROCEDURE — 84145 PROCALCITONIN (PCT): CPT

## 2024-01-01 PROCEDURE — 84300 ASSAY OF URINE SODIUM: CPT

## 2024-01-01 PROCEDURE — 96375 TX/PRO/DX INJ NEW DRUG ADDON: CPT

## 2024-01-01 PROCEDURE — P9045: CPT

## 2024-01-01 PROCEDURE — 74177 CT ABD & PELVIS W/CONTRAST: CPT | Mod: MC

## 2024-01-01 PROCEDURE — 99284 EMERGENCY DEPT VISIT MOD MDM: CPT | Mod: 25

## 2024-01-01 PROCEDURE — 82803 BLOOD GASES ANY COMBINATION: CPT

## 2024-01-01 PROCEDURE — 86900 BLOOD TYPING SEROLOGIC ABO: CPT

## 2024-01-01 PROCEDURE — 87086 URINE CULTURE/COLONY COUNT: CPT

## 2024-01-01 PROCEDURE — 36680 INSERT NEEDLE BONE CAVITY: CPT | Mod: GC

## 2024-01-01 PROCEDURE — 85730 THROMBOPLASTIN TIME PARTIAL: CPT

## 2024-01-01 PROCEDURE — 71045 X-RAY EXAM CHEST 1 VIEW: CPT

## 2024-01-01 PROCEDURE — 83880 ASSAY OF NATRIURETIC PEPTIDE: CPT

## 2024-01-01 PROCEDURE — 84132 ASSAY OF SERUM POTASSIUM: CPT

## 2024-01-01 PROCEDURE — 83690 ASSAY OF LIPASE: CPT

## 2024-01-01 PROCEDURE — 96361 HYDRATE IV INFUSION ADD-ON: CPT

## 2024-01-01 PROCEDURE — 82962 GLUCOSE BLOOD TEST: CPT

## 2024-01-01 PROCEDURE — 83935 ASSAY OF URINE OSMOLALITY: CPT

## 2024-01-01 PROCEDURE — 84133 ASSAY OF URINE POTASSIUM: CPT

## 2024-01-01 PROCEDURE — 97110 THERAPEUTIC EXERCISES: CPT

## 2024-01-01 PROCEDURE — 86850 RBC ANTIBODY SCREEN: CPT

## 2024-01-01 PROCEDURE — 86901 BLOOD TYPING SEROLOGIC RH(D): CPT

## 2024-01-01 PROCEDURE — 85610 PROTHROMBIN TIME: CPT

## 2024-01-01 PROCEDURE — 83605 ASSAY OF LACTIC ACID: CPT

## 2024-01-01 PROCEDURE — 82553 CREATINE MB FRACTION: CPT

## 2024-01-01 PROCEDURE — 85018 HEMOGLOBIN: CPT

## 2024-01-01 PROCEDURE — 80053 COMPREHEN METABOLIC PANEL: CPT

## 2024-01-01 PROCEDURE — 87186 SC STD MICRODIL/AGAR DIL: CPT

## 2024-01-01 PROCEDURE — 82010 KETONE BODYS QUAN: CPT

## 2024-01-01 PROCEDURE — 82330 ASSAY OF CALCIUM: CPT

## 2024-01-01 PROCEDURE — 71275 CT ANGIOGRAPHY CHEST: CPT | Mod: MC

## 2024-01-01 PROCEDURE — 84295 ASSAY OF SERUM SODIUM: CPT

## 2024-01-01 RX ORDER — ACETAMINOPHEN 325 MG/1
1000 TABLET ORAL ONCE
Refills: 0 | Status: COMPLETED | OUTPATIENT
Start: 2024-01-01 | End: 2024-01-01

## 2024-01-01 RX ORDER — ONDANSETRON 2 MG/ML
4 INJECTION, SOLUTION INTRAMUSCULAR; INTRAVENOUS ONCE
Refills: 0 | Status: COMPLETED | OUTPATIENT
Start: 2024-01-01 | End: 2024-01-01

## 2024-01-01 RX ORDER — CALCIUM GLUCONATE 61(648) MG
2 TABLET ORAL ONCE
Refills: 0 | Status: COMPLETED | OUTPATIENT
Start: 2024-01-01 | End: 2024-01-01

## 2024-01-01 RX ORDER — FUROSEMIDE 40 MG
20 TABLET ORAL ONCE
Refills: 0 | Status: DISCONTINUED | OUTPATIENT
Start: 2024-01-01 | End: 2024-01-01

## 2024-01-01 RX ORDER — ASPIRIN 81 MG
1 TABLET, DELAYED RELEASE (ENTERIC COATED) ORAL
Refills: 0 | DISCHARGE

## 2024-01-01 RX ORDER — ALBUMIN (HUMAN) 5 G/20ML
50 SOLUTION INTRAVENOUS ONCE
Refills: 0 | Status: DISCONTINUED | OUTPATIENT
Start: 2024-01-01 | End: 2024-01-01

## 2024-01-01 RX ORDER — PANTOPRAZOLE SODIUM 40 MG
40 TABLET, DELAYED RELEASE (ENTERIC COATED) ORAL DAILY
Refills: 0 | Status: DISCONTINUED | OUTPATIENT
Start: 2024-01-01 | End: 2024-01-01

## 2024-01-01 RX ORDER — SODIUM CHLORIDE 9 MG/ML
1000 INJECTION INTRAMUSCULAR; INTRAVENOUS; SUBCUTANEOUS
Refills: 0 | Status: DISCONTINUED | OUTPATIENT
Start: 2024-01-01 | End: 2024-01-01

## 2024-01-01 RX ORDER — SODIUM CHLORIDE 9 MG/ML
500 INJECTION INTRAMUSCULAR; INTRAVENOUS; SUBCUTANEOUS ONCE
Refills: 0 | Status: COMPLETED | OUTPATIENT
Start: 2024-01-01 | End: 2024-01-01

## 2024-01-01 RX ORDER — MAGNESIUM, ALUMINUM HYDROXIDE 200-225/5
30 SUSPENSION, ORAL (FINAL DOSE FORM) ORAL ONCE
Refills: 0 | Status: COMPLETED | OUTPATIENT
Start: 2024-01-01 | End: 2024-01-01

## 2024-01-01 RX ORDER — ACETAMINOPHEN 500 MG
1000 TABLET ORAL ONCE
Refills: 0 | Status: COMPLETED | OUTPATIENT
Start: 2024-01-01 | End: 2024-01-01

## 2024-01-01 RX ORDER — ASPIRIN 81 MG
81 TABLET, DELAYED RELEASE (ENTERIC COATED) ORAL DAILY
Refills: 0 | Status: DISCONTINUED | OUTPATIENT
Start: 2024-01-01 | End: 2024-01-01

## 2024-01-01 RX ORDER — OXYCODONE HYDROCHLORIDE 5 MG/1
5 TABLET ORAL EVERY 8 HOURS
Refills: 0 | Status: DISCONTINUED | OUTPATIENT
Start: 2024-01-01 | End: 2024-01-01

## 2024-01-01 RX ORDER — VASOPRESSIN 20 [USP'U]/ML
0.04 INJECTION INTRAVENOUS
Qty: 40 | Refills: 0 | Status: DISCONTINUED | OUTPATIENT
Start: 2024-01-01 | End: 2024-01-01

## 2024-01-01 RX ORDER — FOLIC ACID/MULTIVIT,IRON,MINER 0.4MG-18MG
1 TABLET,CHEWABLE ORAL
Refills: 0 | DISCHARGE

## 2024-01-01 RX ORDER — MAGNESIUM, ALUMINUM HYDROXIDE 200-225/5
30 SUSPENSION, ORAL (FINAL DOSE FORM) ORAL EVERY 4 HOURS
Refills: 0 | Status: DISCONTINUED | OUTPATIENT
Start: 2024-01-01 | End: 2024-01-01

## 2024-01-01 RX ORDER — FINASTERIDE 1 MG/1
1 TABLET, FILM COATED ORAL
Refills: 0 | DISCHARGE

## 2024-01-01 RX ORDER — PANTOPRAZOLE SODIUM 40 MG
40 TABLET, DELAYED RELEASE (ENTERIC COATED) ORAL ONCE
Refills: 0 | Status: COMPLETED | OUTPATIENT
Start: 2024-01-01 | End: 2024-01-01

## 2024-01-01 RX ORDER — DEXMEDETOMIDINE HYDROCHLORIDE IN 0.9% SODIUM CHLORIDE 4 UG/ML
0.4 INJECTION INTRAVENOUS
Qty: 200 | Refills: 0 | Status: DISCONTINUED | OUTPATIENT
Start: 2024-01-01 | End: 2024-01-01

## 2024-01-01 RX ORDER — CAPECITABINE 500 MG/1
2 TABLET, FILM COATED ORAL
Refills: 0 | DISCHARGE

## 2024-01-01 RX ORDER — PANTOPRAZOLE SODIUM 40 MG
40 TABLET, DELAYED RELEASE (ENTERIC COATED) ORAL EVERY 12 HOURS
Refills: 0 | Status: DISCONTINUED | OUTPATIENT
Start: 2024-01-01 | End: 2024-01-01

## 2024-01-01 RX ORDER — PHENYLEPHRINE HYDROCHLORIDE 10 MG/ML
0.1 INJECTION INTRAVENOUS
Qty: 160 | Refills: 0 | Status: DISCONTINUED | OUTPATIENT
Start: 2024-01-01 | End: 2024-01-01

## 2024-01-01 RX ORDER — GABAPENTIN 100 MG
300 CAPSULE ORAL AT BEDTIME
Refills: 0 | Status: DISCONTINUED | OUTPATIENT
Start: 2024-01-01 | End: 2024-01-01

## 2024-01-01 RX ORDER — INSULIN REGULAR, HUMAN 100/ML (3)
5 INSULIN PEN (ML) SUBCUTANEOUS ONCE
Refills: 0 | Status: COMPLETED | OUTPATIENT
Start: 2024-01-01 | End: 2024-01-01

## 2024-01-01 RX ORDER — DEXTROSE 15 G/33 G
50 GEL IN PACKET (GRAM) ORAL ONCE
Refills: 0 | Status: COMPLETED | OUTPATIENT
Start: 2024-01-01 | End: 2024-01-01

## 2024-01-01 RX ORDER — ONDANSETRON 2 MG/ML
4 INJECTION, SOLUTION INTRAMUSCULAR; INTRAVENOUS EVERY 6 HOURS
Refills: 0 | Status: DISCONTINUED | OUTPATIENT
Start: 2024-01-01 | End: 2024-01-01

## 2024-01-01 RX ORDER — SODIUM CHLORIDE 9 MG/ML
1000 INJECTION INTRAMUSCULAR; INTRAVENOUS; SUBCUTANEOUS ONCE
Refills: 0 | Status: COMPLETED | OUTPATIENT
Start: 2024-01-01 | End: 2024-01-01

## 2024-01-01 RX ORDER — BACTERIOSTATIC SODIUM CHLORIDE 0.9 %
1000 VIAL (ML) INJECTION ONCE
Refills: 0 | Status: COMPLETED | OUTPATIENT
Start: 2024-01-01 | End: 2024-01-01

## 2024-01-01 RX ORDER — CHLORHEXIDINE GLUCONATE 40 MG/ML
1 SOLUTION TOPICAL DAILY
Refills: 0 | Status: DISCONTINUED | OUTPATIENT
Start: 2024-01-01 | End: 2024-01-01

## 2024-01-01 RX ORDER — SUCRALFATE 1 G/10ML
1 SUSPENSION ORAL ONCE
Refills: 0 | Status: COMPLETED | OUTPATIENT
Start: 2024-01-01 | End: 2024-01-01

## 2024-01-01 RX ORDER — EZETIMIBE 10 MG/1
1 TABLET ORAL
Refills: 0 | DISCHARGE

## 2024-01-01 RX ORDER — SODIUM BICARBONATE 84 MG/ML
50 INJECTION, SOLUTION INTRAVENOUS ONCE
Refills: 0 | Status: COMPLETED | OUTPATIENT
Start: 2024-01-01 | End: 2024-01-01

## 2024-01-01 RX ORDER — OXYCODONE HYDROCHLORIDE 15 MG/1
150 TABLET ORAL ONCE
Refills: 0 | Status: COMPLETED | OUTPATIENT
Start: 2024-01-01 | End: 2024-01-01

## 2024-01-01 RX ORDER — PITAVASTATIN MAGNESIUM 2 MG/1
1 TABLET, FILM COATED ORAL
Refills: 0 | DISCHARGE

## 2024-01-01 RX ORDER — ROPIVACAINE IN 0.9% SOD CHL/PF 0.1 %
0.05 PLASTIC BAG, INJECTION (ML) EPIDURAL
Qty: 16 | Refills: 0 | Status: DISCONTINUED | OUTPATIENT
Start: 2024-01-01 | End: 2024-01-01

## 2024-01-01 RX ORDER — CYANOCOBALAMIN (VITAMIN B-12) 500MCG/0.1
1 GEL (ML) NASAL
Refills: 0 | DISCHARGE

## 2024-01-01 RX ORDER — SUCRALFATE 1 G/10ML
1 SUSPENSION ORAL EVERY 6 HOURS
Refills: 0 | Status: DISCONTINUED | OUTPATIENT
Start: 2024-01-01 | End: 2024-01-01

## 2024-01-01 RX ORDER — SODIUM BICARBONATE 84 MG/ML
1300 INJECTION, SOLUTION INTRAVENOUS
Refills: 0 | Status: DISCONTINUED | OUTPATIENT
Start: 2024-01-01 | End: 2024-01-01

## 2024-01-01 RX ORDER — SODIUM BICARBONATE 84 MG/ML
0.16 INJECTION, SOLUTION INTRAVENOUS
Qty: 75 | Refills: 0 | Status: DISCONTINUED | OUTPATIENT
Start: 2024-01-01 | End: 2024-01-01

## 2024-01-01 RX ORDER — PIPERACILLIN SODIUM AND TAZOBACTAM SODIUM 3; .375 G/15ML; G/15ML
3.38 INJECTION, POWDER, FOR SOLUTION INTRAVENOUS ONCE
Refills: 0 | Status: DISCONTINUED | OUTPATIENT
Start: 2024-01-01 | End: 2024-01-01

## 2024-01-01 RX ORDER — OXYCODONE HYDROCHLORIDE 15 MG/1
0.5 TABLET ORAL
Qty: 450 | Refills: 0 | Status: DISCONTINUED | OUTPATIENT
Start: 2024-01-01 | End: 2024-01-01

## 2024-01-01 RX ORDER — CHLORPROMAZINE HCL 50 MG
25 TABLET ORAL EVERY 8 HOURS
Refills: 0 | Status: DISCONTINUED | OUTPATIENT
Start: 2024-01-01 | End: 2024-01-01

## 2024-01-01 RX ORDER — ONDANSETRON 2 MG/ML
1 INJECTION, SOLUTION INTRAMUSCULAR; INTRAVENOUS
Refills: 0 | DISCHARGE

## 2024-01-01 RX ORDER — SODIUM BICARBONATE 84 MG/ML
50 INJECTION, SOLUTION INTRAVENOUS ONCE
Refills: 0 | Status: DISCONTINUED | OUTPATIENT
Start: 2024-01-01 | End: 2024-01-01

## 2024-01-01 RX ORDER — ONDANSETRON HCL/PF 4 MG/2 ML
4 VIAL (ML) INJECTION ONCE
Refills: 0 | Status: COMPLETED | OUTPATIENT
Start: 2024-01-01 | End: 2024-01-01

## 2024-01-01 RX ORDER — FAMOTIDINE 40 MG/1
20 TABLET, FILM COATED ORAL ONCE
Refills: 0 | Status: COMPLETED | OUTPATIENT
Start: 2024-01-01 | End: 2024-01-01

## 2024-01-01 RX ORDER — PIPERACILLIN SODIUM AND TAZOBACTAM SODIUM 3; .375 G/15ML; G/15ML
3.38 INJECTION, POWDER, FOR SOLUTION INTRAVENOUS ONCE
Refills: 0 | Status: COMPLETED | OUTPATIENT
Start: 2024-01-01 | End: 2024-01-01

## 2024-01-01 RX ORDER — ONDANSETRON 2 MG/ML
4 INJECTION, SOLUTION INTRAMUSCULAR; INTRAVENOUS THREE TIMES A DAY
Refills: 0 | Status: DISCONTINUED | OUTPATIENT
Start: 2024-01-01 | End: 2024-01-01

## 2024-01-01 RX ORDER — PIPERACILLIN SODIUM AND TAZOBACTAM SODIUM 3; .375 G/15ML; G/15ML
3.38 INJECTION, POWDER, FOR SOLUTION INTRAVENOUS EVERY 8 HOURS
Refills: 0 | Status: DISCONTINUED | OUTPATIENT
Start: 2024-01-01 | End: 2024-01-01

## 2024-01-01 RX ORDER — FINASTERIDE 1 MG/1
5 TABLET, FILM COATED ORAL DAILY
Refills: 0 | Status: DISCONTINUED | OUTPATIENT
Start: 2024-01-01 | End: 2024-01-01

## 2024-01-01 RX ORDER — METOCLOPRAMIDE HCL 5 MG
10 TABLET ORAL ONCE
Refills: 0 | Status: COMPLETED | OUTPATIENT
Start: 2024-01-01 | End: 2024-01-01

## 2024-01-01 RX ORDER — CHLORHEXIDINE GLUCONATE 40 MG/ML
15 SOLUTION TOPICAL EVERY 12 HOURS
Refills: 0 | Status: DISCONTINUED | OUTPATIENT
Start: 2024-01-01 | End: 2024-01-01

## 2024-01-01 RX ORDER — FENTANYL CITRATE 50 UG/ML
1 INJECTION INTRAMUSCULAR; INTRAVENOUS
Qty: 5000 | Refills: 0 | Status: DISCONTINUED | OUTPATIENT
Start: 2024-01-01 | End: 2024-01-01

## 2024-01-01 RX ORDER — CAPECITABINE 500 MG/1
3 TABLET, FILM COATED ORAL
Refills: 0 | DISCHARGE

## 2024-01-01 RX ORDER — ROPIVACAINE IN 0.9% SOD CHL/PF 0.1 %
0.05 PLASTIC BAG, INJECTION (ML) EPIDURAL
Qty: 8 | Refills: 0 | Status: DISCONTINUED | OUTPATIENT
Start: 2024-01-01 | End: 2024-01-01

## 2024-01-01 RX ORDER — OXYCODONE HYDROCHLORIDE 15 MG/1
1 TABLET ORAL
Qty: 450 | Refills: 0 | Status: DISCONTINUED | OUTPATIENT
Start: 2024-01-01 | End: 2024-01-01

## 2024-01-01 RX ORDER — SODIUM BICARBONATE 84 MG/ML
0.32 INJECTION, SOLUTION INTRAVENOUS
Qty: 150 | Refills: 0 | Status: DISCONTINUED | OUTPATIENT
Start: 2024-01-01 | End: 2024-01-01

## 2024-01-01 RX ORDER — LORAZEPAM 4 MG/ML
4 INJECTION INTRAMUSCULAR; INTRAVENOUS ONCE
Refills: 0 | Status: DISCONTINUED | OUTPATIENT
Start: 2024-01-01 | End: 2024-01-01

## 2024-01-01 RX ORDER — DIPHENHYDRAMINE HYDROCHLORIDE AND LIDOCAINE HYDROCHLORIDE AND ALUMINUM HYDROXIDE AND MAGNESIUM HYDRO
10 KIT
Refills: 0 | Status: DISCONTINUED | OUTPATIENT
Start: 2024-01-01 | End: 2024-01-01

## 2024-01-01 RX ORDER — IPRATROPIUM BROMIDE AND ALBUTEROL SULFATE .5; 3 MG/3ML; MG/3ML
3 SOLUTION RESPIRATORY (INHALATION) ONCE
Refills: 0 | Status: DISCONTINUED | OUTPATIENT
Start: 2024-01-01 | End: 2024-01-01

## 2024-01-01 RX ORDER — PHENYLEPHRINE HYDROCHLORIDE 10 MG/ML
0.1 INJECTION INTRAVENOUS
Qty: 40 | Refills: 0 | Status: DISCONTINUED | OUTPATIENT
Start: 2024-01-01 | End: 2024-01-01

## 2024-01-01 RX ORDER — ROPIVACAINE IN 0.9% SOD CHL/PF 0.1 %
0.05 PLASTIC BAG, INJECTION (ML) EPIDURAL
Qty: 32 | Refills: 0 | Status: DISCONTINUED | OUTPATIENT
Start: 2024-01-01 | End: 2024-01-01

## 2024-01-01 RX ORDER — BACLOFEN 0.5 MG/ML
5 INJECTION INTRATHECAL ONCE
Refills: 0 | Status: COMPLETED | OUTPATIENT
Start: 2024-01-01 | End: 2024-01-01

## 2024-01-01 RX ORDER — LIDOCAINE/BENZALKONIUM/ALCOHOL
15 SOLUTION, NON-ORAL TOPICAL ONCE
Refills: 0 | Status: COMPLETED | OUTPATIENT
Start: 2024-01-01 | End: 2024-01-01

## 2024-01-01 RX ORDER — AMLODIPINE BESYLATE 10 MG/1
1 TABLET ORAL
Refills: 0 | DISCHARGE

## 2024-01-01 RX ORDER — ALBUMIN (HUMAN) 5 G/20ML
250 SOLUTION INTRAVENOUS ONCE
Refills: 0 | Status: COMPLETED | OUTPATIENT
Start: 2024-01-01 | End: 2024-01-01

## 2024-01-01 RX ADMIN — SUCRALFATE 1 GRAM(S): 1 SUSPENSION ORAL at 23:05

## 2024-01-01 RX ADMIN — SODIUM CHLORIDE 75 MILLILITER(S): 9 INJECTION INTRAMUSCULAR; INTRAVENOUS; SUBCUTANEOUS at 06:00

## 2024-01-01 RX ADMIN — SUCRALFATE 1 GRAM(S): 1 SUSPENSION ORAL at 05:59

## 2024-01-01 RX ADMIN — Medication 4 MILLIGRAM(S): at 06:03

## 2024-01-01 RX ADMIN — Medication 40 MILLIGRAM(S): at 11:59

## 2024-01-01 RX ADMIN — Medication 81 MILLIGRAM(S): at 11:36

## 2024-01-01 RX ADMIN — Medication 2 TABLET(S): at 17:59

## 2024-01-01 RX ADMIN — Medication 50 MILLILITER(S): at 04:06

## 2024-01-01 RX ADMIN — LORAZEPAM 4 MILLIGRAM(S): 4 INJECTION INTRAMUSCULAR; INTRAVENOUS at 23:30

## 2024-01-01 RX ADMIN — Medication 1000 MILLILITER(S): at 07:45

## 2024-01-01 RX ADMIN — SODIUM CHLORIDE 1000 MILLILITER(S): 9 INJECTION INTRAMUSCULAR; INTRAVENOUS; SUBCUTANEOUS at 23:10

## 2024-01-01 RX ADMIN — Medication 300 MILLIGRAM(S): at 22:05

## 2024-01-01 RX ADMIN — Medication 81 MILLIGRAM(S): at 11:57

## 2024-01-01 RX ADMIN — ONDANSETRON 4 MILLIGRAM(S): 2 INJECTION, SOLUTION INTRAMUSCULAR; INTRAVENOUS at 23:06

## 2024-01-01 RX ADMIN — SUCRALFATE 1 GRAM(S): 1 SUSPENSION ORAL at 05:21

## 2024-01-01 RX ADMIN — Medication 100 GRAM(S): at 12:50

## 2024-01-01 RX ADMIN — Medication 40 MILLIGRAM(S): at 11:15

## 2024-01-01 RX ADMIN — SODIUM BICARBONATE 1300 MILLIGRAM(S): 84 INJECTION, SOLUTION INTRAVENOUS at 05:21

## 2024-01-01 RX ADMIN — SUCRALFATE 1 GRAM(S): 1 SUSPENSION ORAL at 23:44

## 2024-01-01 RX ADMIN — ACETAMINOPHEN 400 MILLIGRAM(S): 325 TABLET ORAL at 06:11

## 2024-01-01 RX ADMIN — SODIUM BICARBONATE 50 MILLIEQUIVALENT(S): 84 INJECTION, SOLUTION INTRAVENOUS at 23:10

## 2024-01-01 RX ADMIN — Medication 81 MILLIGRAM(S): at 11:22

## 2024-01-01 RX ADMIN — Medication 102 MILLIGRAM(S): at 06:00

## 2024-01-01 RX ADMIN — SUCRALFATE 1 GRAM(S): 1 SUSPENSION ORAL at 11:35

## 2024-01-01 RX ADMIN — FENTANYL CITRATE 3.52 MICROGRAM(S)/KG/HR: 50 INJECTION INTRAMUSCULAR; INTRAVENOUS at 01:46

## 2024-01-01 RX ADMIN — Medication 30 MILLILITER(S): at 09:41

## 2024-01-01 RX ADMIN — SUCRALFATE 1 GRAM(S): 1 SUSPENSION ORAL at 17:17

## 2024-01-01 RX ADMIN — SUCRALFATE 1 GRAM(S): 1 SUSPENSION ORAL at 18:00

## 2024-01-01 RX ADMIN — FINASTERIDE 5 MILLIGRAM(S): 1 TABLET, FILM COATED ORAL at 11:22

## 2024-01-01 RX ADMIN — Medication 40 MILLIGRAM(S): at 11:35

## 2024-01-01 RX ADMIN — Medication 40 MILLIGRAM(S): at 05:08

## 2024-01-01 RX ADMIN — ALBUMIN (HUMAN) 125 MILLILITER(S): 5 SOLUTION INTRAVENOUS at 00:42

## 2024-01-01 RX ADMIN — SODIUM BICARBONATE 1300 MILLIGRAM(S): 84 INJECTION, SOLUTION INTRAVENOUS at 17:17

## 2024-01-01 RX ADMIN — CHLORHEXIDINE GLUCONATE 1 APPLICATION(S): 40 SOLUTION TOPICAL at 11:27

## 2024-01-01 RX ADMIN — Medication 30 MILLILITER(S): at 15:18

## 2024-01-01 RX ADMIN — CHLORHEXIDINE GLUCONATE 15 MILLILITER(S): 40 SOLUTION TOPICAL at 05:08

## 2024-01-01 RX ADMIN — ONDANSETRON 4 MILLIGRAM(S): 2 INJECTION, SOLUTION INTRAMUSCULAR; INTRAVENOUS at 18:27

## 2024-01-01 RX ADMIN — OXYCODONE HYDROCHLORIDE 5 MILLIGRAM(S): 5 TABLET ORAL at 23:05

## 2024-01-01 RX ADMIN — PIPERACILLIN SODIUM AND TAZOBACTAM SODIUM 25 GRAM(S): 3; .375 INJECTION, POWDER, FOR SOLUTION INTRAVENOUS at 05:08

## 2024-01-01 RX ADMIN — Medication 2 TABLET(S): at 19:51

## 2024-01-01 RX ADMIN — Medication 200 GRAM(S): at 01:28

## 2024-01-01 RX ADMIN — ACETAMINOPHEN 400 MILLIGRAM(S): 325 TABLET ORAL at 23:52

## 2024-01-01 RX ADMIN — Medication 102 MILLIGRAM(S): at 11:33

## 2024-01-01 RX ADMIN — FINASTERIDE 5 MILLIGRAM(S): 1 TABLET, FILM COATED ORAL at 11:16

## 2024-01-01 RX ADMIN — Medication 400 MILLIGRAM(S): at 06:03

## 2024-01-01 RX ADMIN — DIPHENHYDRAMINE HYDROCHLORIDE AND LIDOCAINE HYDROCHLORIDE AND ALUMINUM HYDROXIDE AND MAGNESIUM HYDRO 10 MILLILITER(S): KIT at 18:00

## 2024-01-01 RX ADMIN — SODIUM BICARBONATE 1300 MILLIGRAM(S): 84 INJECTION, SOLUTION INTRAVENOUS at 05:59

## 2024-01-01 RX ADMIN — DIPHENHYDRAMINE HYDROCHLORIDE AND LIDOCAINE HYDROCHLORIDE AND ALUMINUM HYDROXIDE AND MAGNESIUM HYDRO 10 MILLILITER(S): KIT at 23:29

## 2024-01-01 RX ADMIN — Medication 40 MILLIGRAM(S): at 06:11

## 2024-01-01 RX ADMIN — DIPHENHYDRAMINE HYDROCHLORIDE AND LIDOCAINE HYDROCHLORIDE AND ALUMINUM HYDROXIDE AND MAGNESIUM HYDRO 10 MILLILITER(S): KIT at 11:24

## 2024-01-01 RX ADMIN — SODIUM CHLORIDE 75 MILLILITER(S): 9 INJECTION INTRAMUSCULAR; INTRAVENOUS; SUBCUTANEOUS at 18:03

## 2024-01-01 RX ADMIN — ONDANSETRON 4 MILLIGRAM(S): 2 INJECTION, SOLUTION INTRAMUSCULAR; INTRAVENOUS at 12:06

## 2024-01-01 RX ADMIN — DIPHENHYDRAMINE HYDROCHLORIDE AND LIDOCAINE HYDROCHLORIDE AND ALUMINUM HYDROXIDE AND MAGNESIUM HYDRO 10 MILLILITER(S): KIT at 21:43

## 2024-01-01 RX ADMIN — BACLOFEN 5 MILLIGRAM(S): 0.5 INJECTION INTRATHECAL at 12:49

## 2024-01-01 RX ADMIN — SUCRALFATE 1 GRAM(S): 1 SUSPENSION ORAL at 05:30

## 2024-01-01 RX ADMIN — Medication 1000 MILLILITER(S): at 05:49

## 2024-01-01 RX ADMIN — ACETAMINOPHEN 400 MILLIGRAM(S): 325 TABLET ORAL at 18:27

## 2024-01-01 RX ADMIN — Medication 1000 MILLIGRAM(S): at 07:45

## 2024-01-01 RX ADMIN — OXYCODONE HYDROCHLORIDE 600 MILLIGRAM(S): 15 TABLET ORAL at 00:15

## 2024-01-01 RX ADMIN — Medication 2 TABLET(S): at 11:16

## 2024-01-01 RX ADMIN — DIPHENHYDRAMINE HYDROCHLORIDE AND LIDOCAINE HYDROCHLORIDE AND ALUMINUM HYDROXIDE AND MAGNESIUM HYDRO 10 MILLILITER(S): KIT at 17:31

## 2024-01-01 RX ADMIN — OXYCODONE HYDROCHLORIDE 5 MILLIGRAM(S): 5 TABLET ORAL at 20:50

## 2024-01-01 RX ADMIN — SUCRALFATE 1 GRAM(S): 1 SUSPENSION ORAL at 23:29

## 2024-01-01 RX ADMIN — SUCRALFATE 1 GRAM(S): 1 SUSPENSION ORAL at 12:51

## 2024-01-01 RX ADMIN — OXYCODONE HYDROCHLORIDE 5 MILLIGRAM(S): 5 TABLET ORAL at 11:57

## 2024-01-01 RX ADMIN — Medication 75 MILLILITER(S): at 21:26

## 2024-01-01 RX ADMIN — Medication 30 MILLILITER(S): at 23:05

## 2024-01-01 RX ADMIN — Medication 30 MILLILITER(S): at 05:21

## 2024-01-01 RX ADMIN — SUCRALFATE 1 GRAM(S): 1 SUSPENSION ORAL at 17:59

## 2024-01-01 RX ADMIN — Medication 2 TABLET(S): at 17:26

## 2024-01-01 RX ADMIN — FINASTERIDE 5 MILLIGRAM(S): 1 TABLET, FILM COATED ORAL at 11:58

## 2024-01-01 RX ADMIN — SUCRALFATE 1 GRAM(S): 1 SUSPENSION ORAL at 17:26

## 2024-01-01 RX ADMIN — Medication 30 MILLILITER(S): at 17:59

## 2024-01-01 RX ADMIN — SODIUM CHLORIDE 2000 MILLILITER(S): 9 INJECTION INTRAMUSCULAR; INTRAVENOUS; SUBCUTANEOUS at 00:18

## 2024-01-01 RX ADMIN — SODIUM BICARBONATE 1300 MILLIGRAM(S): 84 INJECTION, SOLUTION INTRAVENOUS at 05:30

## 2024-01-01 RX ADMIN — Medication 200 GRAM(S): at 04:06

## 2024-01-01 RX ADMIN — SODIUM CHLORIDE 750 MILLILITER(S): 9 INJECTION INTRAMUSCULAR; INTRAVENOUS; SUBCUTANEOUS at 13:23

## 2024-01-01 RX ADMIN — ACETAMINOPHEN 1000 MILLIGRAM(S): 325 TABLET ORAL at 06:41

## 2024-01-01 RX ADMIN — Medication 40 MILLIGRAM(S): at 11:23

## 2024-01-01 RX ADMIN — SODIUM BICARBONATE 1300 MILLIGRAM(S): 84 INJECTION, SOLUTION INTRAVENOUS at 17:58

## 2024-01-01 RX ADMIN — SUCRALFATE 1 GRAM(S): 1 SUSPENSION ORAL at 11:23

## 2024-01-01 RX ADMIN — Medication 2 TABLET(S): at 05:58

## 2024-01-01 RX ADMIN — Medication 75 MILLILITER(S): at 18:56

## 2024-01-01 RX ADMIN — FAMOTIDINE 20 MILLIGRAM(S): 40 TABLET, FILM COATED ORAL at 06:03

## 2024-01-01 RX ADMIN — SODIUM BICARBONATE 50 MILLIEQUIVALENT(S): 84 INJECTION, SOLUTION INTRAVENOUS at 23:45

## 2024-01-01 RX ADMIN — OXYCODONE HYDROCHLORIDE 5 MILLIGRAM(S): 5 TABLET ORAL at 23:44

## 2024-01-01 RX ADMIN — ACETAMINOPHEN 1000 MILLIGRAM(S): 325 TABLET ORAL at 02:55

## 2024-01-01 RX ADMIN — SODIUM BICARBONATE 125 MEQ/KG/HR: 84 INJECTION, SOLUTION INTRAVENOUS at 11:28

## 2024-01-01 RX ADMIN — Medication 102 MILLIGRAM(S): at 21:43

## 2024-01-01 RX ADMIN — SUCRALFATE 1 GRAM(S): 1 SUSPENSION ORAL at 11:59

## 2024-01-01 RX ADMIN — Medication 102 MILLIGRAM(S): at 18:19

## 2024-01-01 RX ADMIN — SODIUM BICARBONATE 1300 MILLIGRAM(S): 84 INJECTION, SOLUTION INTRAVENOUS at 17:59

## 2024-01-01 RX ADMIN — SODIUM BICARBONATE 1300 MILLIGRAM(S): 84 INJECTION, SOLUTION INTRAVENOUS at 17:25

## 2024-01-01 RX ADMIN — Medication 3.3 MICROGRAM(S)/KG/MIN: at 01:53

## 2024-01-01 RX ADMIN — CHLORHEXIDINE GLUCONATE 1 APPLICATION(S): 40 SOLUTION TOPICAL at 12:31

## 2024-01-01 RX ADMIN — SODIUM CHLORIDE 1000 MILLILITER(S): 9 INJECTION INTRAMUSCULAR; INTRAVENOUS; SUBCUTANEOUS at 13:15

## 2024-01-01 RX ADMIN — Medication 15 MILLILITER(S): at 15:18

## 2024-01-01 RX ADMIN — SUCRALFATE 1 GRAM(S): 1 SUSPENSION ORAL at 15:19

## 2024-01-01 RX ADMIN — OXYCODONE HYDROCHLORIDE 33.3 MG/MIN: 15 TABLET ORAL at 01:41

## 2024-01-01 RX ADMIN — Medication 1 TABLET(S): at 20:07

## 2024-01-01 RX ADMIN — DEXMEDETOMIDINE HYDROCHLORIDE IN 0.9% SODIUM CHLORIDE 7.03 MICROGRAM(S)/KG/HR: 4 INJECTION INTRAVENOUS at 01:47

## 2024-01-01 RX ADMIN — Medication 2 TABLET(S): at 11:22

## 2024-01-01 RX ADMIN — Medication 30 MILLILITER(S): at 17:17

## 2024-01-01 RX ADMIN — OXYCODONE HYDROCHLORIDE 5 MILLIGRAM(S): 5 TABLET ORAL at 19:51

## 2024-01-01 RX ADMIN — SODIUM BICARBONATE 150 MEQ/KG/HR: 84 INJECTION, SOLUTION INTRAVENOUS at 03:35

## 2024-01-01 RX ADMIN — PIPERACILLIN SODIUM AND TAZOBACTAM SODIUM 200 GRAM(S): 3; .375 INJECTION, POWDER, FOR SOLUTION INTRAVENOUS at 01:27

## 2024-01-01 RX ADMIN — Medication 2 TABLET(S): at 23:28

## 2024-01-01 RX ADMIN — Medication 30 MILLILITER(S): at 21:58

## 2024-01-01 RX ADMIN — FINASTERIDE 5 MILLIGRAM(S): 1 TABLET, FILM COATED ORAL at 11:35

## 2024-01-01 RX ADMIN — Medication 81 MILLIGRAM(S): at 11:27

## 2024-01-01 RX ADMIN — Medication 5 UNIT(S): at 04:06

## 2024-01-01 RX ADMIN — Medication 10 MILLIGRAM(S): at 11:34

## 2024-07-18 ENCOUNTER — OUTPATIENT (OUTPATIENT)
Dept: OUTPATIENT SERVICES | Facility: HOSPITAL | Age: 64
LOS: 1 days | End: 2024-07-18
Payer: MEDICARE

## 2024-07-18 ENCOUNTER — APPOINTMENT (OUTPATIENT)
Dept: CT IMAGING | Facility: CLINIC | Age: 64
End: 2024-07-18
Payer: MEDICARE

## 2024-07-18 DIAGNOSIS — Z00.8 ENCOUNTER FOR OTHER GENERAL EXAMINATION: ICD-10-CM

## 2024-07-18 PROCEDURE — 74177 CT ABD & PELVIS W/CONTRAST: CPT | Mod: 26

## 2024-07-18 PROCEDURE — 71260 CT THORAX DX C+: CPT | Mod: 26

## 2024-07-18 PROCEDURE — 71260 CT THORAX DX C+: CPT

## 2024-07-18 PROCEDURE — 74177 CT ABD & PELVIS W/CONTRAST: CPT

## 2024-07-30 ENCOUNTER — APPOINTMENT (OUTPATIENT)
Dept: SURGERY | Facility: CLINIC | Age: 64
End: 2024-07-30
Payer: MEDICARE

## 2024-07-30 VITALS
WEIGHT: 168 LBS | HEART RATE: 91 BPM | HEIGHT: 70 IN | SYSTOLIC BLOOD PRESSURE: 121 MMHG | BODY MASS INDEX: 24.05 KG/M2 | TEMPERATURE: 97.3 F | DIASTOLIC BLOOD PRESSURE: 82 MMHG | OXYGEN SATURATION: 98 % | RESPIRATION RATE: 17 BRPM

## 2024-07-30 DIAGNOSIS — K62.89 OTHER SPECIFIED DISEASES OF ANUS AND RECTUM: ICD-10-CM

## 2024-07-30 PROCEDURE — 45330 DIAGNOSTIC SIGMOIDOSCOPY: CPT

## 2024-07-30 PROCEDURE — 99203 OFFICE O/P NEW LOW 30 MIN: CPT | Mod: 25

## 2024-07-30 RX ORDER — AMLODIPINE BESYLATE 5 MG/1
5 TABLET ORAL
Refills: 0 | Status: ACTIVE | COMMUNITY

## 2024-07-30 RX ORDER — RIBOFLAVIN (VITAMIN B2) 50 MG
TABLET ORAL
Refills: 0 | Status: ACTIVE | COMMUNITY

## 2024-07-30 RX ORDER — PITAVASTATIN CALCIUM 4.18 MG/1
4 TABLET, FILM COATED ORAL
Refills: 0 | Status: ACTIVE | COMMUNITY

## 2024-07-30 RX ORDER — CHOLECALCIFEROL (VITAMIN D3) 25 MCG
TABLET ORAL
Refills: 0 | Status: ACTIVE | COMMUNITY

## 2024-07-30 RX ORDER — FINASTERIDE 5 MG/1
5 TABLET, FILM COATED ORAL
Refills: 0 | Status: ACTIVE | COMMUNITY

## 2024-07-30 RX ORDER — EZETIMIBE 10 MG/1
10 TABLET ORAL
Refills: 0 | Status: ACTIVE | COMMUNITY

## 2024-07-30 RX ORDER — HYDROCORTISONE 2.5% 25 MG/G
2.5 CREAM TOPICAL
Qty: 1 | Refills: 0 | Status: ACTIVE | COMMUNITY
Start: 2024-07-30 | End: 1900-01-01

## 2024-07-30 NOTE — ASSESSMENT
[FreeTextEntry1] : In summary the patient has what appears to be locally advanced rectosigmoid carcinoma.  He denies abdominal pain nausea vomiting and is moving his bowels without difficulty.  The distal edge of the tumor is approximately 14 cm from the anal verge on flexible sigmoidoscopy.  The lesion appears to be just above the peritoneal reflection on CT and pelvic MRI.  There are mildly enlarged perirectal and iliac nodes without evidence of distant metastases on CT chest abdomen pelvis and pelvic MRI.  The patient has seen oncology and colorectal surgery at [facility] and presents for second opinion.  I explained that treatment options include upfront surgery and possibly adjuvant chemotherapy versus upfront chemotherapy +/- radiation followed by potential surgery.  I explained that surgery would typically be a low anterior resection.  He will be seeing Hadley Brown later this week and I have scheduled him for the next upcoming rectal cancer tumor board next week.  If the consensus is to proceed with surgery I would perform a flexible sigmoidoscopy on him the week before to remove the diminutive benign-appearing sessile polyp in his mid rectum.

## 2024-07-30 NOTE — ASSESSMENT
[FreeTextEntry1] : In summary the patient has what appears to be locally advanced rectosigmoid carcinoma.  He denies abdominal pain nausea vomiting and is moving his bowels without difficulty.  The distal edge of the tumor is approximately 14 cm from the anal verge on flexible sigmoidoscopy.  The lesion appears to be just above the peritoneal reflection on CT and pelvic MRI.  There are mildly enlarged perirectal and iliac nodes without evidence of distant metastases on CT chest abdomen pelvis and pelvic MRI.  The patient has seen oncology and colorectal surgery at [facility] [place] and presents for second opinion.  I explained that treatment options include upfront surgery and possibly adjuvant chemotherapy versus upfront chemotherapy +/- radiation followed by potential surgery.  I explained that surgery would typically be a low anterior resection.  He will be seeing Hadley Brown later this week and I have scheduled him for the next upcoming rectal cancer tumor board next week.  If the consensus is to proceed with surgery I would perform a flexible sigmoidoscopy on him the week before to remove the diminutive benign-appearing sessile polyp in his mid rectum.

## 2024-07-30 NOTE — PHYSICAL EXAM
[Normal Breath Sounds] : Normal breath sounds [Normal Heart Sounds] : normal heart sounds [No Rash or Lesion] : No rash or lesion [Alert] : alert [Oriented to Person] : oriented to person [Oriented to Place] : oriented to place [Oriented to Time] : oriented to time [Calm] : calm [de-identified] : Soft, nontender, reducible small left inguinal hernia.  No palpable right inguinal hernia.  No palpable inguinal adenopathy. [de-identified] : Perianal inspection is normal digital rectal examination is normal.  Flexible sigmoidoscopy reveals a malignant circumferential ulcerated mass at 14 cm.  There is a diminutive (3 mm) benign appearing sessile polyp several centimeters distal to the lesion. [de-identified] : WNL [de-identified] : WNL [de-identified] : RAMANDEEPL [de-identified] : WNL [de-identified] : WNL

## 2024-07-30 NOTE — PHYSICAL EXAM
[Normal Breath Sounds] : Normal breath sounds [Normal Heart Sounds] : normal heart sounds [No Rash or Lesion] : No rash or lesion [Alert] : alert [Oriented to Person] : oriented to person [Oriented to Place] : oriented to place [Oriented to Time] : oriented to time [Calm] : calm [de-identified] : Soft, nontender, reducible small left inguinal hernia.  No palpable right inguinal hernia.  No palpable inguinal adenopathy. [de-identified] : Perianal inspection is normal digital rectal examination is normal.  Flexible sigmoidoscopy reveals a malignant circumferential ulcerated mass at 14 cm.  There is a diminutive (3 mm) benign appearing sessile polyp several centimeters distal to the lesion. [de-identified] : WNL [de-identified] : WNL [de-identified] : RAMANDEEPL [de-identified] : WNL [de-identified] : WNL

## 2024-07-30 NOTE — ASSESSMENT
[FreeTextEntry1] : In summary the patient has what appears to be locally advanced rectosigmoid carcinoma.  He denies abdominal pain nausea vomiting and is moving his bowels without difficulty.  The distal edge of the tumor is approximately 14 cm from the anal verge on flexible sigmoidoscopy.  The lesion appears to be just above the peritoneal reflection on CT and pelvic MRI.  There are mildly enlarged perirectal and iliac nodes without evidence of distant metastases on CT chest abdomen pelvis and pelvic MRI.  The patient [place]ology [place] [facility] [place] for second opinion.  I explained that treatment options include upfront surgery and possibly adjuvant chemotherapy versus upfront chemotherapy +/- radiation followed by potential surgery.  I explained that surgery would typically be a low anterior resection.  He will be seeing Hadley Brown later this week and I have scheduled him for the next upcoming rectal cancer tumor board next week.  If the consensus is to proceed with surgery I would perform a flexible sigmoidoscopy on him the week before to remove the diminutive benign-appearing sessile polyp in his mid rectum.

## 2024-07-30 NOTE — CONSULT LETTER
[Dear  ___] : Dear  [unfilled], [Consult Letter:] : I had the pleasure of evaluating your patient, [unfilled]. [Please see my note below.] : Please see my note below. [Consult Closing:] : Thank you very much for allowing me to participate in the care of this patient.  If you have any questions, please do not hesitate to contact me. [Sincerely,] : Sincerely, [FreeTextEntry3] : Rio Araujo M.D., F.A.C.S, F.A.S.C.R.S [DrRocio  ___] : Dr. ANN [DrRocio ___] : Dr. ANN

## 2024-07-30 NOTE — PHYSICAL EXAM
[Normal Breath Sounds] : Normal breath sounds [Normal Heart Sounds] : normal heart sounds [No Rash or Lesion] : No rash or lesion [Alert] : alert [Oriented to Person] : oriented to person [Oriented to Place] : oriented to place [Oriented to Time] : oriented to time [Calm] : calm [de-identified] : Soft, nontender, reducible small left inguinal hernia.  No palpable right inguinal hernia.  No palpable inguinal adenopathy. [de-identified] : Perianal inspection is normal digital rectal examination is normal.  Flexible sigmoidoscopy reveals a malignant circumferential ulcerated mass at 14 cm.  There is a diminutive (3 mm) benign appearing sessile polyp several centimeters distal to the lesion. [de-identified] : WNL [de-identified] : WNL [de-identified] : RAMANDEEPL [de-identified] : WNL [de-identified] : WNL

## 2024-07-30 NOTE — HISTORY OF PRESENT ILLNESS
[FreeTextEntry1] : Nicholas is a 65 y/o male here for a consultation visit, colon cancer.   Colonoscopy on 7/18/24 (due to anemia) (first colonoscopy pt has had) - colo rectal cancer. Sigmoid: Rectal mass at rectosigmoid infection to 20cm.  Pathology:  F. Rectum # 1 - Adenocarcinoma, with extensive necrosis.  Clinical impression: MASS G. Rectum #2 - Adenocarcinoma, superficial fragments.     CT A+P and chest on 7/18/24 - Rectosigmoid cancer. Suspect regional lymphadenopathy. Multiple thoracic and lumbar spine vertebral bodies lucent foci, possibly metastases. Consider MRI or PET/CT. Indeterminate small right renal lower pole lesion. Consider targeted renal sonogram for further evaluation.  MRI Rectum on 7/23/24 -  1. Sigmoid/high rectal tumor completely above the peritoneal refletion with deep posterior tumor and EMD penetration extening to the posterior mesial rectal fascia.  Multiple mesorectal superior rectal and pelvic sidewall lymph nodes are indeterminate.   2. Overall imaging stage:  T: 3d (>1.5 cm) 3. Anal canal: anal canal not involved. 4. Mesorectal fascia: involved (< or = 0.1 cm) 5. Suspicious extra TME nodes: absent.    Today pt reports no abdominal pain. Daily BM (slight variation currently after colonoscopy), formed, slight anal pain since colonoscopy, occasional bleeding since every few months on tp, denies feeling any swollen or prolapsing tissue. Denies nausea/vomiting. Denies fever/chills. Good appetite - denies any unintentional weight loss recently. Feeling a bit feeling tired. Takes baby aspirin, for prevention. No family hx of colon or rectal cancer.

## 2024-08-01 ENCOUNTER — NON-APPOINTMENT (OUTPATIENT)
Age: 64
End: 2024-08-01

## 2024-08-02 ENCOUNTER — APPOINTMENT (OUTPATIENT)
Dept: HEMATOLOGY ONCOLOGY | Facility: CLINIC | Age: 64
End: 2024-08-02

## 2024-08-02 VITALS
TEMPERATURE: 96.9 F | OXYGEN SATURATION: 97 % | RESPIRATION RATE: 16 BRPM | HEIGHT: 69.96 IN | DIASTOLIC BLOOD PRESSURE: 80 MMHG | WEIGHT: 168.63 LBS | SYSTOLIC BLOOD PRESSURE: 121 MMHG | BODY MASS INDEX: 24.14 KG/M2 | HEART RATE: 92 BPM

## 2024-08-02 DIAGNOSIS — C20 MALIGNANT NEOPLASM OF RECTUM: ICD-10-CM

## 2024-08-02 PROCEDURE — G2211 COMPLEX E/M VISIT ADD ON: CPT | Mod: NC

## 2024-08-02 PROCEDURE — 99205 OFFICE O/P NEW HI 60 MIN: CPT

## 2024-08-09 PROBLEM — C20 RECTAL ADENOCARCINOMA: Status: ACTIVE | Noted: 2024-01-01

## 2024-08-09 NOTE — PHYSICAL EXAM
[Fully active, able to carry on all pre-disease performance without restriction] : Status 0 - Fully active, able to carry on all pre-disease performance without restriction [Normal] : affect appropriate [de-identified] : non distended

## 2024-08-09 NOTE — REVIEW OF SYSTEMS
[Diarrhea: Grade 0] : Diarrhea: Grade 0 [Negative] : Allergic/Immunologic [FreeTextEntry7] : change in bowel habits , blood in stool

## 2024-08-09 NOTE — PHYSICAL EXAM
[Fully active, able to carry on all pre-disease performance without restriction] : Status 0 - Fully active, able to carry on all pre-disease performance without restriction [Normal] : affect appropriate [de-identified] : non distended

## 2024-08-09 NOTE — HISTORY OF PRESENT ILLNESS
[Disease: _____________________] : Disease: [unfilled] [T: ___] : T[unfilled] [N: ___] : N[unfilled] [M: ___] : M[unfilled] [AJCC Stage: ____] : AJCC Stage: [unfilled] [de-identified] : 64-year-old male was in his usual state of health until earlier this year had noticed change in bowel habits and blood in stool and had not had a prior colonoscopy so went for colonoscopy and was found to have a high rectal mass that was ultimately biopsied by Dr. Gill and found to be consistent with adenocarcinoma and a subsequently went for imaging as well as evaluation in [facility] and presents the office today for further evaluation.

## 2024-08-09 NOTE — HISTORY OF PRESENT ILLNESS
[Disease: _____________________] : Disease: [unfilled] [T: ___] : T[unfilled] [N: ___] : N[unfilled] [M: ___] : M[unfilled] [AJCC Stage: ____] : AJCC Stage: [unfilled] [de-identified] : 64-year-old male was in his usual state of health until earlier this year had noticed change in bowel habits and blood in stool and had not had a prior colonoscopy so went for colonoscopy and was found to have a high rectal mass that was ultimately biopsied by Dr. Gill and found to be consistent with adenocarcinoma and a subsequently went for imaging as well as evaluation in [facility] and presents the office today for further evaluation.

## 2024-08-16 NOTE — ED PROVIDER NOTE - NSFOLLOWUPINSTRUCTIONS_ED_ALL_ED_FT
Dehydration, Adult  Dehydration is a condition in which there is not enough water or other fluids in the body. This happens when a person loses more fluids than they take in. Important organs, such as the kidneys, brain, and heart, cannot function without a proper amount of fluids. Any loss of fluids from the body can lead to dehydration.    Dehydration can be mild, moderate, or severe. It should be treated right away to prevent it from becoming severe.    What are the causes?  Dehydration may be caused by:  Health conditions, such as diarrhea, vomiting, fever, infection, or sweating or urinating a lot.  Not drinking enough fluids.  Certain medicines, such as medicines that remove excess fluid from the body (diuretics).  Lack of safe drinking water.  Not being able to get enough water and food.  What increases the risk?  The following factors may make you more likely to develop this condition:  Having a long-term (chronic) illness that has not been treated properly, such as diabetes, heart disease, or kidney disease.  Being 65 years of age or older.  Having a disability.  Living in a place that is high in altitude, where thinner, drier air causes more fluid loss.  Doing exercises that put stress on your body for a long time (endurance sports).  Being active in a hot climate.  What are the signs or symptoms?  Symptoms of dehydration depend on how severe it is.    Mild or moderate dehydration    Thirst.  Dry lips or dry mouth.  Dizziness or light-headedness.  Muscle cramps.  Dark urine. Urine may be the color of tea.  Less urine or tears produced than usual.  Headache.  Severe dehydration    Changes in skin. Your skin may be cold and clammy, blotchy, or pale. Your skin also may not return to normal after being lightly pinched and released.  Little or no tears, urine, or sweat.  Rapid breathing and low blood pressure. Your pulse may be weak or may be faster than 100 beats per minute when you are sitting still.  Other changes, such as:  Feeling very thirsty.  Sunken eyes.  Cold hands and feet.  Confusion.  Being very tired (lethargic) or having trouble waking from sleep.  Short-term weight loss.  Loss of consciousness.  How is this diagnosed?  This condition is diagnosed based on your symptoms and a physical exam. You may have blood and urine tests to help confirm the diagnosis.    How is this treated?  A person's hand, showing an inserted IV catheter.   Treatment for this condition depends on how severe it is. Treatment should be started right away. Do not wait until dehydration becomes severe. Severe dehydration is an emergency and needs to be treated in a hospital.  Mild or moderate dehydration can be treated at home. You may be asked to:  Drink more fluids.  Drink an oral rehydration solution (ORS). This drink restores fluids, salts, and minerals in the blood (electrolytes).  Stop any activities that caused dehydration, such as exercise.  Cool off with cool compresses, cool mist, or cool fluids, if heat or too much sweat caused your condition.  Take medicine to treat fever, if fever caused your condition.  Take medicine to treat nausea and diarrhea, if vomiting or diarrhea caused your condition.  Severe dehydration can be treated:  With IV fluids.  By correcting abnormal levels of electrolytes in your body.  By treating the underlying cause of dehydration.  Follow these instructions at home:  Oral rehydration solution    A glass of water with a spoonful of ORS ready to be added to it.  If told by your health care provider, drink an ORS:  Make an ORS by following instructions on the package.  Start by drinking small amounts, about ½ cup (120 mL) every 5–10 minutes.  Slowly increase how much you drink until you have taken the amount recommended by your health care provider.  Eating and drinking    A person drinking water from a glass.   Drink enough clear fluid to keep your urine pale yellow. If you were told to drink an ORS, finish the ORS first and then start slowly drinking other clear fluids. Drink fluids such as:  Water. Do not drink only water. Doing that can lead to hyponatremia, which is having too little salt (sodium) in the body.  Water from ice chips you suck on.  Diluted fruit juice. This is fruit juice that you have added water to.  Low-calorie sports drinks.  Eat foods that contain a healthy balance of electrolytes, such as bananas, oranges, potatoes, tomatoes, and spinach.  Do not drink alcohol.  Avoid the following:  Drinks that contain a lot of sugar. These include high-calorie sports drinks, fruit juice that is not diluted, and soda.  Caffeine.  Foods that are greasy or contain a lot of fat or sugar.  General instructions    Take over-the-counter and prescription medicines only as told by your health care provider.  Do not take sodium tablets. Doing that can lead to having too much sodium in the body (hypernatremia).  Return to your normal activities as told by your health care provider. Ask your health care provider what activities are safe for you.  Keep all follow-up visits. Your health care provider may need to check your progress and suggest new ways to treat your condition.  Contact a health care provider if:  You have muscle cramps, pain, or discomfort, such as:  Pain in your abdomen and the pain gets worse or stays in one area.  Stiff neck.  You have a rash.  You are more irritable than usual.  You are sleepier or have a harder time waking.  You feel weak or dizzy.  You feel very thirsty.  Get help right away if:  You have symptoms of severe dehydration.  You vomit every time you eat or drink.  Your vomiting gets worse, does not go away, or includes blood or green matter (bile).  You are getting treatment but symptoms are getting worse.  You have a fever.  You have a severe headache.  You have:  Diarrhea that gets worse or does not go away.  Blood in your stool. This may cause stool to look black and tarry.  Not urinating, or urinating only a small amount of very dark urine, within 6–8 hours.  You have trouble breathing.  These symptoms may be an emergency. Get help right away.  Do not wait to see if the symptoms will go away.  Do not drive yourself to the hospital. Call 911.  This information is not intended to replace advice given to you by your health care provider. Make sure you discuss any questions you have with your health care provider.      Diarrhea, Adult  Diarrhea is frequent loose and sometimes watery bowel movements. Diarrhea can make you feel weak and cause you to become dehydrated. Dehydration is a condition in which there is not enough water or other fluids in the body. Dehydration can make you tired and thirsty, cause you to have a dry mouth, and decrease how often you urinate.    Diarrhea typically lasts 2–3 days. However, it can last longer if it is a sign of something more serious. It is important to treat your diarrhea as told by your health care provider.    Follow these instructions at home:  Eating and drinking    A bottle of clear fruit juice and glass of water.   Bread, rice, and cereal from the grain group.  Follow these recommendations as told by your health care provider:  Take an oral rehydration solution (ORS). This is an over-the-counter medicine that helps return your body to its normal balance of nutrients and water. It is found at pharmacies and retail stores.  Drink enough fluid to keep your urine pale yellow.  Drink fluids such as water, diluted fruit juice, and low-calorie sports drinks. You can drink milk also, if desired. Sucking on ice chips is another way to get fluids.  Avoid drinking fluids that contain a lot of sugar or caffeine, such as soda, energy drinks, and regular sports drinks.  Avoid alcohol.  Eat bland, easy-to-digest foods in small amounts as you are able. These foods include bananas, applesauce, rice, lean meats, toast, and crackers.  Avoid spicy or fatty foods.  Medicines    Take over-the-counter and prescription medicines only as told by your health care provider.  If you were prescribed antibiotics, take them as told by your health care provider. Do not stop using the antibiotic even if you start to feel better.  General instructions    Washing hands with soap and water.  Wash your hands often using soap and water for at least 20 seconds. If soap and water are not available, use hand . Others in the household should wash their hands as well. Hands should be washed:  After using the toilet or changing a diaper.  Before preparing, cooking, or serving food.  While caring for a sick person or while visiting someone in a hospital.  Rest at home while you recover.  Take a warm bath to relieve any burning or pain from frequent diarrhea episodes.  Watch your condition for any changes.  Contact a health care provider if:  You have a fever.  Your diarrhea gets worse.  You have new symptoms.  You vomit every time you eat or drink.  You feel light-headed, dizzy, or have a headache.  You have muscle cramps.  You have signs of dehydration, such as:  Dark urine, very little urine, or no urine.  Cracked lips.  Dry mouth.  Sunken eyes.  Sleepiness.  Weakness.  You have bloody or black stools or stools that look like tar.  You have severe pain, cramping, or bloating in your abdomen.  Your skin feels cold and clammy.  You feel confused.  Get help right away if:  You have chest pain or your heart is beating very quickly.  You have trouble breathing or you are breathing very quickly.  You feel extremely weak or you faint.  These symptoms may be an emergency. Get help right away. Call 911.  Do not wait to see if the symptoms will go away.  Do not drive yourself to the hospital.  This information is not intended to replace advice given to you by your health care provider. Make sure you discuss any questions you have with your health care provider.

## 2024-08-16 NOTE — ED ADULT NURSE NOTE - NSFALLHARMRISKINTERV_ED_ALL_ED
Assistance OOB with selected safe patient handling equipment if applicable/Assistance with ambulation/Communicate risk of Fall with Harm to all staff, patient, and family/Encourage patient to sit up slowly, dangle for a short time, stand at bedside before walking/Monitor gait and stability/Orthostatic vital signs/Provide visual cue: red socks, yellow wristband, yellow gown, etc/Reinforce activity limits and safety measures with patient and family/Bed in lowest position, wheels locked, appropriate side rails in place/Call bell, personal items and telephone in reach/Instruct patient to call for assistance before getting out of bed/chair/stretcher/Non-slip footwear applied when patient is off stretcher/[place] to call system/Physically safe environment - no spills, clutter or unnecessary equipment/Purposeful Proactive Rounding/Room/bathroom lighting operational, light cord in reach

## 2024-08-16 NOTE — ED PROVIDER NOTE - PATIENT PORTAL LINK FT
You can access the FollowMyHealth Patient Portal offered by [facility] by registering at the following website: [email or web address]. By joining [employer]’s FollowMyHealth portal, you will also be able to view your health information using other applications (apps) compatible with our system.

## 2024-08-16 NOTE — ED PROVIDER NOTE - PROGRESS NOTE DETAILS
Reevaluated patient at bedside.  Patient feeling much improved, no current symptoms, relates feels best hes felt in days.  Discussed the results of all diagnostic testing in ED and copies of all reports given.   Patient aware of bands on manual diff, doesn't want to wait for us to speak with heme/onc, he has appt at [facility] this afternoon, will see his heme/onc there.  An opportunity to ask questions was given.  Discussed the importance of prompt, close medical follow-up.  Patient will return with any changes, concerns or persistent / worsening symptoms.  Understanding of all instructions verbalized.

## 2024-08-16 NOTE — ED ADULT TRIAGE NOTE - GLASGOW COMA SCALE: BEST VERBAL RESPONSE, MLM
Addendum  created 10/14/19 1249 by Sobeida Morris MD    Diagnosis association updated, Intraprocedure Blocks edited, Sign clinical note       (V5) oriented

## 2024-08-16 NOTE — ED PROVIDER NOTE - CLINICAL SUMMARY MEDICAL DECISION MAKING FREE TEXT BOX
Patient with diarrhea after chemotherapy.  Feels dehydrated and gets dizzy on standing.  Will check labs and give IV fluids.  Patient has already had stool testing and a CAT scan.  No indication for head CT at this time.  Further treatment and disposition to be based on findings and patient's response to treatment

## 2024-08-16 NOTE — ED ADULT NURSE NOTE - OBJECTIVE STATEMENT
Pt presents to the ER complaining of diarrhea and falling today since he was dizzy. As per pt he has being diagnose with rectal cancer and started chemo two weeks ago.

## 2024-08-16 NOTE — ED ADULT NURSE NOTE - CAS DISCH TRANSFER METHOD
Patient: Manuel Duffy OETT:   : 1938 Attending: Elan Johnson MD   78year old female        Chief complaint: right foot wound/ulcer  Reason for consult:  hyponatremia    HPI from initial consult on 23: This is a 78year old female with a past medical history significant for hypothyroidism, myeloproliferative disorder and RA. She presented to the ED yesterday with c/o right foot wound which began 2-3 weeks ago. Pt reported that her footwear had been rubbing at that area. Had been taking Tylenol as needed for pain. Associated symptoms included intermittent chest pains and diarrhea. US Duplex right obtained and showed: near occlusive stenosis in the proximal right SFA with postobstructive waveforms distally. Popliteal artery is patent. Multifocal plaquing throughout the posterior and anterior tibial artery. High-grade stenosis or occlusion is suspected in the peroneal artery. MRI of right foot is pending. Vascular surgery has been consulted. Per daughter she has not been eating much in the last few weeks. Nephrology has been consulted for evaluation of hyponatremia. Serum sodium level of 122 on presentation. Chart reviewed- has history of chronic hyponatremia with level typically in 131-133 range. Has h/o hypothyroidism- last TSH checked in May and was within range. On diuretic (Lasix) PTA. No thiazide listed in meds. 23  No cp or sob  On 1200 cc fluid restriction  Na 129        Past Medical History:   Diagnosis Date   â¢ Hypothyroidism    â¢ Myeloproliferative neoplasm (CMD)    â¢ Osteoporosis    â¢ RA (rheumatoid arthritis) (CMD)        Past Surgical History:   Procedure Laterality Date   â¢ Appendectomy     â¢ Eye surgery  2019    cateracs bilateral    â¢ Tonsillectomy and adenoidectomy         Social History     Socioeconomic History   â¢ Marital status:       Spouse name: Not on file   â¢ Number of children: 1   â¢ Years of education: Not on file   â¢ Highest education level: Not on file   Occupational History   â¢ Occupation: office   Tobacco Use   â¢ Smoking status: Former     Current packs/day: 0.00     Types: Cigarettes     Quit date: 9/3/1980     Years since quittin.2   â¢ Smokeless tobacco: Never   Vaping Use   â¢ Vaping Use: never used   Substance and Sexual Activity   â¢ Alcohol use: Not Currently   â¢ Drug use: Never   â¢ Sexual activity: Not on file   Other Topics Concern   â¢ Not on file   Social History Narrative    Patient smoked greater 5 years in the distant past.  Lives alone in a flat next door to her daughter. Social Determinants of Health     Financial Resource Strain: Low Risk  (11/15/2023)    Financial Resource Strain    â¢ Social Determinants: Financial Resource Strain: None   Food Insecurity: No Food Insecurity (2023)    Food Insecurity    â¢ Social Determinants: Food Insecurity: Rarely   Transportation Needs: No Transportation Needs (2023)    PRAPARE - Transportation    â¢ Lack of Transportation (Medical): No    â¢ Lack of Transportation (Non-Medical):  No   Physical Activity: Not on file   Stress: Not on file   Social Connections: Socially Integrated (2023)    Social Connections    â¢ Social Determinants: Social Connections: 5 or more times a week   Intimate Partner Violence: Not At Risk (2023)    Intimate Partner Violence    â¢ Social Determinants: Intimate Partner Violence Past Fear: No    â¢ Social Determinants: Intimate Partner Violence Current Fear: No       Family History   Problem Relation Age of Onset   â¢ Thyroid Mother    â¢ Musculoskeletal Mother    â¢ Thyroid Sister    â¢ Heart Father    â¢ Heart Brother        ALLERGIES:   Allergen Reactions   â¢ Alendronate [Alendronic Acid] SWELLING     THROAT SWELLING   â¢ Ciprofloxacin SWELLING     THROAT SWELLING      Current Facility-Administered Medications   Medication   â¢ glycopyrrolate (ROBINUL) injection 0.2 mg   â¢ docusate sodium-sennosides (SENOKOT S) 50-8.6 MG 2 tablet   â¢ bisacodyl (DULCOLAX) suppository 10 mg   â¢ magnesium hydroxide (MILK OF MAGNESIA) 400 MG/5ML suspension 30 mL   â¢ lactulose (CHRONULAC) 10 GM/15ML solution 20 g   â¢ CARBOXYMethylcellulose (REFRESH TEARS) 0.5 % ophthalmic solution 1 drop   â¢ lidocaine 2% urethral (UROJET) 2 % jelly 10 mL   â¢ aluminum-magnesium hydroxide-simethicone (MAALOX) 200-200-20 MG/5ML suspension 30 mL   â¢ oxyCODONE (IMM REL) (ROXICODONE) tablet 5 mg   â¢ morphine (ROXANOL) 100 MG/5ML (20 mg/mL) concentrated oral solution 10 mg    Or   â¢ morphine (IMM REL) (MSIR) tablet 15 mg    Or   â¢ morphine injection 4 mg   â¢ haloperidol lactate (HALDOL) 5 MG/ML short-acting injection 1 mg   â¢ LORazepam (ATIVAN) tablet 0.5 mg   â¢ LORazepam (ATIVAN) injection 0.5 mg   â¢ acetaminophen (TYLENOL) 160 MG/5ML solution 650 mg   â¢ dextrose 50 % injection 25 g   â¢ dextrose 50 % injection 12.5 g   â¢ glucagon (GLUCAGEN) injection 1 mg   â¢ dextrose (GLUTOSE) 40 % gel 15 g   â¢ dextrose (GLUTOSE) 40 % gel 30 g   â¢ aspirin (ECOTRIN) enteric coated tablet 81 mg   â¢ [Held by provider] hydroxyUREA (HYDREA) capsule 500 mg   â¢ levothyroxine (SYNTHROID, LEVOTHROID) tablet 75 mcg   â¢ lisinopril (ZESTRIL) tablet 10 mg   â¢ metoPROLOL tartrate (LOPRESSOR) tablet 25 mg   â¢ predniSONE (DELTASONE) tablet 5 mg   â¢ urea powder (URE-NA) packet 15 g   â¢ sodium chloride (NORMAL SALINE) 0.9 % bolus 500 mL   â¢ sodium chloride 0.9 % flush bag 25 mL   â¢ sodium chloride 0.9 % injection 2 mL   â¢ melatonin tablet 3 mg   â¢ ondansetron (ZOFRAN ODT) disintegrating tablet 4 mg    Or   â¢ ondansetron (ZOFRAN) injection 4 mg   â¢ iohexol ORAL (OMNIPAQUE 350) oral contrast solution 25 mL         Review of Systems:  Positives stated above in HPI. Full ROS completed and is otherwise negative.        Vital Last Value 24 Hour Range   Temperature 97.5 Â°F (36.4 Â°C) Temp  Min: 97.5 Â°F (36.4 Â°C)  Max: 97.5 Â°F (36.4 Â°C)   Pulse (!) 56 Pulse  Min: 56  Max: 60   Respiratory 15 Resp  Min: 14  Max: 18 "  Blood Pressure 129/67 BP  Min: 129/67  Max: 150/60   Art BP   No data recorded   Pulse Oximetry 99 % SpO2  Min: 99 %  Max: 99 %     Vital Today Admitted   Weight 33.6 kg (74 lb 1.2 oz) Weight: 34 kg (74 lb 15.3 oz)   Height N/A Height: 4' 8"" (142.2 cm)   BMI N/A BMI (Calculated): 16.8     Weight over the past 48 Hours:  No data found. Hemodynamics:      Last Value 24 Hour Range   CVP   No data recorded   PAS/PAD   No data recorded   PCWP   No data recorded   CO   No data recorded   CI   No data recorded   SVR   No data recorded   SV02   No data recorded     Intake/Output:    Last Stool Occurrence: 1 (11/14/23 1200)    I/O this shift:  In: -   Out: 150 [Urine:150]    I/O last 3 completed shifts: In: 250 [P.O.:250]  Out: 150 [Urine:150]      Intake/Output Summary (Last 24 hours) at 11/16/2023 1440  Last data filed at 11/16/2023 1059  Gross per 24 hour   Intake 250 ml   Output 300 ml   Net -50 ml         Physical Exam:  General: Alert, no acute distress  HEENT: Head atraumatic, normocephalic. Moist oral mucus membranes. Sclera non-icteric. Neck: Supple, no JVD. Trachea midline. Chest/Lungs: Normal effort. Symmetrical expansion. Clear to auscultation. No wheezes, rales or rhonchi. CVS: Regular rate and rhythm. S1,S2 well heard. Has no pericardial rub heard. Abdomen: BS well heard. Soft, non tender, non distended. No hepatosplenomegaly. Neuro: No focal neurological deficit. A&O x 3. Skin: Warm, dry. No rashes. Extremities: No clubbing or cyanosis. No edema.       Laboratory Results:  Recent Labs   Lab 11/14/23  1722 11/14/23  1423 11/14/23  0856 11/14/23  0520 11/12/23  1700 11/12/23  0654 11/11/23  2049 11/11/23  1534   SODIUM 129* 128* 126* 130*   < > 124*   < > 122*   POTASSIUM 3.8  --  3.4 3.5   < > 3.5   < > 4.4   CHLORIDE 93*  --  93* 93*   < > 90*   < > 87*   CO2 31  --  27 30   < > 25   < > 28   ANIONGAP 9  --  9 11   < > 13   < > 11   BUN 20  --  21* 20   < > 12   < > 21*   CREATININE " 0.34*  --  0.38* 0.36*   < > 0.35*   < > 0.44*   GLUCOSE 90  --  66* 68*   < > 56*   < > 88   CALCIUM 8.5  --  8.4 8.5   < > 8.0*   < > 9.2   ALBUMIN  --   --   --   --   --  2.7*  --  3.5*   AST  --   --   --   --   --  23  --  26   GPT  --   --   --   --   --  20  --  26   ALKPT  --   --   --   --   --  68  --  86   BILIRUBIN  --   --   --   --   --  0.8  --  0.8    < > = values in this interval not displayed. Recent Labs   Lab 11/14/23  0520 11/13/23  0804 11/12/23  0654   WBC 7.9 9.5 10.8   HGB 14.1 14.2 14.5   HCT 41.1 40.5 41.4    232 245       No results found    Recent Labs   Lab 11/11/23  1534   CRP 2.5*       Urine Panel  Recent Labs   Lab 11/12/23  0330   JEFFREY 69   UOSM 408       Imaging/Procedures:    US Vascular Lower Extremity Arteries Duplex Right (11/11/23): IMPRESSION:  1. Multifocal bulky calcific plaquing throughout the right lower extremity  arterial system. Near occlusive stenosis in the proximal right SFA with  postobstructive waveforms distally. 2. Popliteal artery is patent. Multifocal plaquing throughout the posterior  and anterior tibial artery. High-grade stenosis or occlusion is suspected  in the peroneal artery. CT abdomen pelvis w contrast (11/12/23): IMPRESSION:    1. Small bilateral pleural effusions with right lower lobe compressive  atelectasis/scarring. 2. No acute abnormality in the abdomen or pelvis. CTA 11/13/23  IMPRESSION:  Â   1. Very high-grade stenosis or occlusion of the proximal right superficial  femoral artery due to very heavy calcification  Â   2. Left lower extremity stenosis appears mild and most notable in the  femoral-popliteal segment. Â   3. Occluded superior mesenteric artery is better seen on today's study but  was present on the prior study. There is high-grade inferior mesenteric  artery stenosis or occlusion. There is narrowing of the celiac artery. This  places the patient increased risk of mesenteric ischemia.  The bowel appears  well perfused today. Â   4. Apparent thickening surrounding the aorta. This is most likely due to  redistribution of the adjacent pleural effusion and anasarca unless aortic  pathology were clinically suspected. Â   5. Poor enhancement of the right semitendinosis muscle. This is favored to  be ischemic unless an intramuscular collection is clinically suspected  . 6. Imaging findings otherwise similar to 1 day prior. Â     Impression, Plan & Recommendations:    Â· Hyponatremia. H/o chronic hyponatremia, with worsening on admission. Â· Not overloaded on exam  Â· H/o recent diarrhea and poor po intake   Â· Urine indices checked 11/12/23:   Â· U-Osm 408, U-Na 69.  Â· SIADH however urine indices were checked after having receiving IV fluids/normal saline). Â· Has h/o hypothyroidism- recheck TSH  Â· Was given IV fluids (0.9% saline). Serum sodium with mild improvement. Â·  fluid restriction 1200 cc/day, off IVF  Â· On urea  Â· Avoid rapid correction 6-8 meq per   Sodium fluctuating improved to 129    Â· Right foot ulcer, PAD  Â· S/p LE Duplex US- results above  Â· Vascular surgery consulted  Â· On empiric antibiotic  Â· MRI of foot noted, possible osteo, limited study.  CTA noted    Plan for home hospice Private car

## 2024-08-16 NOTE — ED PROVIDER NOTE - CROS ED ROS STATEMENT
Well female. Up-to-date on Pap. Due in February 2023. Has 2 children ages 3 and 3. Uses condoms for contraception.   Previously on birth control pills; declines them at this time all other ROS negative except as per HPI

## 2024-08-16 NOTE — ED ADULT TRIAGE NOTE - CHIEF COMPLAINT QUOTE
was dizzy and fell pta, hit head on the floor, scrape noted on head, diarrhea x 1 week, watery approx 3-4x per day, with stomach pain, with nausea, denies vomiting, dx with rectal ca 2 weeks ago.

## 2024-08-16 NOTE — ED PROVIDER NOTE - SKIN, MLM
Skin normal color for race, warm, dry and intact. No evidence of rash. Zmall abrasion to right forehead

## 2024-08-16 NOTE — ED PROVIDER NOTE - OBJECTIVE STATEMENT
Patient presents with complaints of dizziness on standing.  Patient was diagnosed with rectal cancer a few weeks ago.  Just completed a 14-day course of chemotherapy.  Developed diarrhea as a result.  Has had multiple episodes of diarrhea daily.  Last night felt weak on standing.  This occurred again this morning and patient fell hitting his head.  No loss of consciousness.  Patient has no headache or neck pain.  No nausea or vomiting.  Feels very dehydrated.  Patient was seen by his oncologist and had a CAT scan 2 days ago.  CAT scan showed signs of ileitis and colitis as well as the findings consistent with rectal cancer.  Patient was prescribed 3 doses of erythromycin.  Stool was tested for C. difficile and was negative.  Patient also had a PCR panel of the stool all of which returned negative patient denies fever.  No cough or sore throat.  Mild runny nose.

## 2024-08-19 PROBLEM — I10 ESSENTIAL (PRIMARY) HYPERTENSION: Chronic | Status: ACTIVE | Noted: 2024-01-01

## 2024-08-19 PROBLEM — E78.00 PURE HYPERCHOLESTEROLEMIA, UNSPECIFIED: Chronic | Status: ACTIVE | Noted: 2024-01-01

## 2024-08-19 PROBLEM — C20 MALIGNANT NEOPLASM OF RECTUM: Chronic | Status: ACTIVE | Noted: 2024-01-01

## 2024-08-19 NOTE — CONSULT NOTE ADULT - SUBJECTIVE AND OBJECTIVE BOX
Reason for consult: Rectal adenocarcinoma    HPI: Hematology/oncology consulted on this 63 y/o male with HTN, BPH, upper rectal adenocarcinoma, under care of Dr. Marcello Caballero at [facility], started treatment with oxaliplatin on 8/2/2024 + capecitabine. He received Venofer x 3, last on 8/16. He presented to [facility] urgent care earlier due to worsening diarrhea (no blood reported), poor appetite and weight loss. He was last seen in the [facility] 8/14, where CT noted ileitis and colitis. He was given IVF, IV Pepcid, IV Zofran and IV Toradol and subsequently sent home. Stool studies were negative, but he was empirically treated with azithromycin 500mg x 3 days the following day. Last Friday he called to report dizziness and fall with head strike 4am. He  presented to a local ER where he received 2L IV hydration, antiemetics and pain medications. He started Lomotil on Saturday which initially helped but symptoms returned yesterday. Zeloda has been on hold since 8/15.    At urgent care earlier was found to have MICHAEL, gastroenteritis likely medication induced, FTT 2/2 nausea/vomiting and prolong QTc. Patient was +orthostatic on exam, s/p IV zofran and IV famotidine, Na 129 K 3.5 Mag 1.7, BUN/Cr 38/1.7  and s/p 1L IVF. Advised to come to [facility] for further management, IVF, cardiac monitoring, cardiac/GI evaluation.    PAST MEDICAL & SURGICAL HISTORY:  Rectal cancer      HTN (hypertension)      High cholesterol      No significant past surgical history          FAMILY HISTORY:      Alochol: Denied  Smoking: Nonsmoker  Drug Use: Denied  Marital Status:         Allergies    No Known Allergies    Intolerances        MEDICATIONS  (STANDING):  sodium chloride 0.9% Bolus 1000 milliLiter(s) IV Bolus once    MEDICATIONS  (PRN):      ROS  No fever, sweats, chills  Dizziness when standing  No epistaxis, HA, sore throat  No CP, SOB, cough, sputum  N/V/D, poor PO intake   No edema  No rash  No anxiety  No back pain, joint pain  No bleeding, bruising  No dysuria, hematuria    T(C): 37.1 (08-19-24 @ 12:04), Max: 37.1 (08-19-24 @ 12:04)  HR: 93 (08-19-24 @ 12:25) (93 - 95)  BP: 129/82 (08-19-24 @ 12:25) (101/70 - 129/82)  RR: 18 (08-19-24 @ 12:25) (18 - 18)  SpO2: 95% (08-19-24 @ 12:25) (95% - 96%)  Wt(kg): --    PE  NAD  Awake, alert  Anicteric, MMM  RRR  CTAB  Abd soft, mildly tender to palpation  No c/c/e  No rash grossly  FROM

## 2024-08-19 NOTE — CONSULT NOTE ADULT - SUBJECTIVE AND OBJECTIVE BOX
HPI: Mr. Gibson is a 64 year-old man with history of hypertension, hyperlipidemia, and stage 4 rectal cancer. He is managed by Heme-Onc Dr. Marcello Caballero at [facility] and was placed on Oxaliplatin+Capecitabine starting 8/2/24; his most recent dose of chemotherapy was given on 8/16/24. On 8/14/24, he presented to [facility] of late with worsening diarrhea, poor appetite, and weight loss; he underwent CT notable for ileitis and colitis. He was given IVF, IV Pepcid, IV Zofran, and IV Toradol and sent home; he was also treated empirically with oral Zithromax for 3 days. On Friday 8/16/24, he presented ot a local ER with dizziness and s/p fall with head trauma. He received 2L IV hydration, antiemetics, and pain medication and was sent home from the ER. He returned to Urgent Care today with similar symptoms to those previously reported; he was noted on labwork there to have MICHAEL with creatinine 1.7, and was noted to be orthostatic on exam. Therefore he was sent to the [facility] ER for further management.    PAST MEDICAL & SURGICAL HISTORY:  Rectal cancer  HTN (hypertension)  High cholesterol    Allergies  No Known Allergies    SOCIAL HISTORY:  Denies ETOh,Smoking,     FAMILY HISTORY:  No CKD    REVIEW OF SYSTEMS:  CONSTITUTIONAL: (+)fatigue, (+)weight loss, (+)poor appetite  EYES/ENT: No visual changes;  No vertigo or throat pain   NECK: No pain or stiffness  RESPIRATORY: No cough, wheezing, hemoptysis; No shortness of breath  CARDIOVASCULAR: (+)dizziness  GASTROINTESTINAL: (+)diarrhea  GENITOURINARY: No dysuria, frequency or hematuria  NEUROLOGICAL: (+)fall  SKIN: No itching, burning, rashes, or lesions   All other review of systems is negative unless indicated above.    VITAL:  T(C): , Max: 37.7 (08-19-24 @ 17:39)  T(F): , Max: 99.8 (08-19-24 @ 17:39)  HR: 95 (08-19-24 @ 17:39)  BP: 125/76 (08-19-24 @ 17:39)  RR: 16 (08-19-24 @ 17:39)  SpO2: 98% (08-19-24 @ 17:39)    PHYSICAL EXAM:  Constitutional: NAD, Alert  HEENT: NCAT, MMM  Neck: Supple, No JVD  Respiratory: CTA-b/l  Cardiovascular: RRR s1s2, no m/r/g  Gastrointestinal: BS+, soft, NT/ND  Extremities: No peripheral edema b/l  Neurological: no focal deficits; strength grossly intact  Back: no CVAT b/l  Skin: No rashes, no nevi    LABS:                        14.0   4.80  )-----------( 494      ( 19 Aug 2024 13:19 )             42.2     Na(128)/K(3.7)/Cl(96)/HCO3(16)/BUN(41)/Cr(1.65)Glu(156)/Ca(9.0)/Mg(2.4)/PO4(5.7)  Alb 2.9  08-19 @ 13:19    (8/16/24) - Na 132, BUN 24, Cr 1.77      IMAGING:  < from: CT Chest w/ IV Cont (07.18.24 @ 15:26) >  KIDNEYS/URETERS: 8 mm right lower pole partially exophytic hypodense   lesion, consider targeted renal ultrasound. Couple small left renal cysts.  BLADDER: Trabeculated secondary to chronic outlet obstruction.  REPRODUCTIVE ORGANS: Markedly enlarged prostate.  IMPRESSION:  Rectosigmoid cancer. Suspect regional lymphadenopathy.  Multiple thoracic and lumbar spine vertebral bodies lucent foci, possibly   metastases. Consider MRI or PET/CT.    < from: Xray Chest 1 View AP/PA (08.19.24 @ 15:30) >  Clear lungs.      ASSESSMENT:  (1)Renal - presumed MICHAEL (presumed that the creatinine of 1.6-1.7mg/dL is not his baseline) - Most likely there is a component of ischemic ATN here, from combination of intravascular depletion and from administration of IV Toradol of late. Unclear if the CT performed days ago involved IV contrast....this could certainly have contributed to ATN here as well. Doubt Oxaloplatin-associated ATN here.    (2)Hyponatremia - likely from intravascular depletion    (3)Metabolic acidosis - increased anion gap - starvation ketoacidosis?    (4)Hyperphosphatemia - surprising; unclear etiology    (5)CV - intravascularly depleted/orthostatic    RECOMMEND:  (1)Add 1/2NS+75meq/L NaHCO3, 75cc/h  (2)No further Toradol/no other NSAIDs for now  (3)BMP+Mg+PO4 daily  (4)Serum: lactate, beta-hydroxybutrate  (5)Urine: Na+, K+, Cl-, creatinine, urinalysis  (6)Dose new meds for GFR 30-40ml/min  (7)Orthostatics qshift    Thank you for involving [facility] in this patient's care.    With warm regards,    Julio Ohara MD   [facility]  Office: [phone]  Cell: [phone]             HPI: Mr. Gibson is a 64 year-old man with history of hypertension, hyperlipidemia, and recently-diagnosed rectal cancer. He is managed by Heme-Onc Dr. Marcello Caballero at [facility] and was placed on Oxaliplatin+Capecitabine starting 8/2/24; his most recent dose of chemotherapy was given on 8/16/24. On 8/14/24, he presented to [facility] of late with worsening diarrhea, poor appetite, and weight loss; he underwent CT notable for ileitis and colitis. He was given IVF, IV Pepcid, IV Zofran, and IV Toradol and sent home; he was also treated empirically with oral Zithromax for 3 days. On Friday 8/16/24, he presented ot a local ER with dizziness and s/p fall with head trauma. He received 2L IV hydration, antiemetics, and pain medication and was sent home from the ER. He returned to Urgent Care today with similar symptoms to those previously reported; he was noted on labwork there to have MICHAEL with creatinine 1.7, and was noted to be orthostatic on exam. Therefore he was sent to the [facility] ER for further management.    Mr. Gibson denies known past history of CKD or MICHAEL. He shares that his CT performed on 8/14/24 did involve IV contrast. He is voiding well and denies notable urinary changes. He shares that whereas the initial imaging from 7/2024 suggested that he had bony mets from cancer, his PET scan proved that the bony lesions seen on CT are in fact not metastases, but rather, are due to degenerative disease. His cancer is believed to be relatively early-stage. After completion of 12 weeks of chemo, he would undergo tumor resection, such that he should be in remission after the surgery.    PAST MEDICAL & SURGICAL HISTORY:  Rectal cancer  HTN (hypertension)  High cholesterol    Allergies  No Known Allergies    SOCIAL HISTORY:  Denies ETOh,Smoking,     FAMILY HISTORY:  No CKD    REVIEW OF SYSTEMS:  CONSTITUTIONAL: (+)fatigue, (+)weight loss, (+)poor appetite  EYES/ENT: No visual changes;  No vertigo or throat pain   NECK: No pain or stiffness  RESPIRATORY: No cough, wheezing, hemoptysis; No shortness of breath  CARDIOVASCULAR: (+)dizziness  GASTROINTESTINAL: (+)diarrhea  GENITOURINARY: No dysuria, frequency or hematuria  NEUROLOGICAL: (+)fall  SKIN: No itching, burning, rashes, or lesions   All other review of systems is negative unless indicated above.    VITAL:  T(C): , Max: 37.7 (08-19-24 @ 17:39)  T(F): , Max: 99.8 (08-19-24 @ 17:39)  HR: 95 (08-19-24 @ 17:39)  BP: 125/76 (08-19-24 @ 17:39)  RR: 16 (08-19-24 @ 17:39)  SpO2: 98% (08-19-24 @ 17:39)    PHYSICAL EXAM:  Constitutional: NAD, Alert  HEENT: NCAT, MMM  Neck: Supple, No JVD  Respiratory: CTA-b/l  Cardiovascular: RRR s1s2, no m/r/g  Gastrointestinal: BS+, soft, NT/ND  Extremities: No peripheral edema b/l  Neurological: no focal deficits; strength grossly intact  Back: no CVAT b/l  Skin: No rashes, no nevi    LABS:                        14.0   4.80  )-----------( 494      ( 19 Aug 2024 13:19 )             42.2     Na(128)/K(3.7)/Cl(96)/HCO3(16)/BUN(41)/Cr(1.65)Glu(156)/Ca(9.0)/Mg(2.4)/PO4(5.7)  Alb 2.9  08-19 @ 13:19    (8/16/24) - Na 132, BUN 24, Cr 1.77      IMAGING:  < from: CT Chest w/ IV Cont (07.18.24 @ 15:26) >  KIDNEYS/URETERS: 8 mm right lower pole partially exophytic hypodense   lesion, consider targeted renal ultrasound. Couple small left renal cysts.  BLADDER: Trabeculated secondary to chronic outlet obstruction.  REPRODUCTIVE ORGANS: Markedly enlarged prostate.  IMPRESSION:  Rectosigmoid cancer. Suspect regional lymphadenopathy.  Multiple thoracic and lumbar spine vertebral bodies lucent foci, possibly   metastases. Consider MRI or PET/CT.    < from: Xray Chest 1 View AP/PA (08.19.24 @ 15:30) >  Clear lungs.      ASSESSMENT:  (1)Renal - presumed MICHAEL (presumed that the creatinine of 1.6-1.7mg/dL is not his baseline) - Most likely there is a component of ischemic ATN here, from combination of intravascular depletion and from administration of IV Toradol and and/or IV contrast. Doubt Oxaloplatin-associated ATN here.    (2)Hyponatremia - likely from intravascular depletion    (3)Metabolic acidosis - increased anion gap - starvation ketoacidosis?    (4)Hyperphosphatemia - surprising; unclear etiology    (5)CV - intravascularly depleted/orthostatic    RECOMMEND:  (1)Add 1/2NS+75meq/L NaHCO3, 75cc/h  (2)No further Toradol/no other NSAIDs for now  (3)BMP+Mg+PO4 daily  (4)Serum: lactate, beta-hydroxybutrate  (5)Urine: Na+, K+, Cl-, creatinine, urinalysis  (6)Dose new meds for GFR 30-40ml/min  (7)Orthostatics qshift    Thank you for involving [facility] in this patient's care.    With warm regards,    Julio Ohara MD   [facility]  Office: [phone]  Cell: [phone]             HPI: Mr. Gibson is a 64 year-old man with history of hypertension, hyperlipidemia, and recently-diagnosed rectal cancer. He is managed by Heme-Onc Dr. Marcello Caballero at [facility] and was placed on Oxaliplatin+Capecitabine starting 8/2/24; his most recent dose of chemotherapy was given on 8/16/24. On 8/14/24, he presented [place] [facility] of late with worsening diarrhea, poor appetite, and weight loss; he underwent CT notable for ileitis and colitis. He was given IVF, IV Pepcid, IV Zofran, and IV Toradol and sent home; he was also treated empirically with oral Zithromax for 3 days. On Friday 8/16/24, he presented ot a local ER with dizziness and s/p fall with head trauma. He received 2L IV hydration, antiemetics, and pain medication and was sent home from the ER. He returned to Urgent Care today with similar symptoms to those previously reported; he was noted on labwork there to have MICHAEL with creatinine 1.7, and was noted to be orthostatic on exam. Therefore he was sent to the [facility] ER for further management.    Mr. Gibson denies known past history of CKD or MICHAEL. He shares that his CT performed on 8/14/24 did involve IV contrast. He is voiding well and denies notable urinary changes. He shares that whereas the initial imaging from 7/2024 suggested that he had bony mets from cancer, his PET scan proved that the bony lesions seen on CT are in fact not metastases, but rather, are due to degenerative disease. His cancer is believed to be relatively early-stage. After completion of 12 weeks of chemo, he would undergo tumor resection, such that he should be in remission after the surgery.    PAST MEDICAL & SURGICAL HISTORY:  Rectal cancer  HTN (hypertension)  High cholesterol    Allergies  No Known Allergies    SOCIAL HISTORY:  Denies ETOh,Smoking,     FAMILY HISTORY:  No CKD    REVIEW OF SYSTEMS:  CONSTITUTIONAL: (+)fatigue, (+)weight loss, (+)poor appetite  EYES/ENT: No visual changes;  No vertigo or throat pain   NECK: No pain or stiffness  RESPIRATORY: No cough, wheezing, hemoptysis; No shortness of breath  CARDIOVASCULAR: (+)dizziness  GASTROINTESTINAL: (+)diarrhea  GENITOURINARY: No dysuria, frequency or hematuria  NEUROLOGICAL: (+)fall  SKIN: No itching, burning, rashes, or lesions   All other review of systems is negative unless indicated above.    VITAL:  T(C): , Max: 37.7 (08-19-24 @ 17:39)  T(F): , Max: 99.8 (08-19-24 @ 17:39)  HR: 95 (08-19-24 @ 17:39)  BP: 125/76 (08-19-24 @ 17:39)  RR: 16 (08-19-24 @ 17:39)  SpO2: 98% (08-19-24 @ 17:39)    PHYSICAL EXAM:  Constitutional: NAD, Alert  HEENT: NCAT, MMM  Neck: Supple, No JVD  Respiratory: CTA-b/l  Cardiovascular: RRR s1s2, no m/r/g  Gastrointestinal: BS+, soft, NT/ND  Extremities: No peripheral edema b/l  Neurological: no focal deficits; strength grossly intact  Back: no CVAT b/l  Skin: No rashes, no nevi    LABS:                        14.0   4.80  )-----------( 494      ( 19 Aug 2024 13:19 )             42.2     Na(128)/K(3.7)/Cl(96)/HCO3(16)/BUN(41)/Cr(1.65)Glu(156)/Ca(9.0)/Mg(2.4)/PO4(5.7)  Alb 2.9  08-19 @ 13:19    (8/16/24) - Na 132, BUN 24, Cr 1.77      IMAGING:  < from: CT Chest w/ IV Cont (07.18.24 @ 15:26) >  KIDNEYS/URETERS: 8 mm right lower pole partially exophytic hypodense   lesion, consider targeted renal ultrasound. Couple small left renal cysts.  BLADDER: Trabeculated secondary to chronic outlet obstruction.  REPRODUCTIVE ORGANS: Markedly enlarged prostate.  IMPRESSION:  Rectosigmoid cancer. Suspect regional lymphadenopathy.  Multiple thoracic and lumbar spine vertebral bodies lucent foci, possibly   metastases. Consider MRI or PET/CT.    < from: Xray Chest 1 View AP/PA (08.19.24 @ 15:30) >  Clear lungs.      ASSESSMENT:  (1)Renal - presumed MICHAEL (presumed that the creatinine of 1.6-1.7mg/dL is not his baseline) - Most likely there is a component of ischemic ATN here, from combination of intravascular depletion and from administration of IV Toradol and and/or IV contrast. Doubt Oxaloplatin-associated ATN here.    (2)Hyponatremia - likely from intravascular depletion    (3)Metabolic acidosis - mildly increased anion gap - likely multifactorial from GI losses and starvation ketoacidosis    (4)Hyperphosphatemia - surprising; unclear etiology    (5)CV - intravascularly depleted/orthostatic    RECOMMEND:  (1)Add 1/2NS+75meq/L NaHCO3, 75cc/h  (2)No further Toradol/no other NSAIDs for now  (3)BMP+Mg+PO4 daily  (4)Serum: lactate, beta-hydroxybutrate  (5)Urine: Na+, K+, Cl-, creatinine, urinalysis  (6)Dose new meds for GFR 30-40ml/min  (7)Orthostatics qshift    Thank you for involving [facility] in this patient's care.    With warm regards,    Julio Ohara MD   [facility]  Office: [phone]  Cell: [phone]

## 2024-08-19 NOTE — H&P ADULT - NSHPPHYSICALEXAM_GEN_ALL_CORE
Uncertain Diagnosis  Meg Acuna  Dear Dr. Luly Reyes:  Clinical information (provided below) indicates an unknown status of a documented diagnosis.  For accurate ICD-10-CM code assignment to reflect severity of illness and risk o Vital Signs Last 24 Hrs  T(C): 37.7 (19 Aug 2024 17:39), Max: 37.7 (19 Aug 2024 17:39)  T(F): 99.8 (19 Aug 2024 17:39), Max: 99.8 (19 Aug 2024 17:39)  HR: 95 (19 Aug 2024 17:39) (83 - 95)  BP: 125/76 (19 Aug 2024 17:39) (101/70 - 129/82)  BP(mean): --  RR: 16 (19 Aug 2024 17:39) (16 - 18)  SpO2: 98% (19 Aug 2024 17:39) (95% - 99%)    Parameters below as of 19 Aug 2024 17:39  Patient On (Oxygen Delivery Method): room air      PHYSICAL EXAM:  GENERAL: NAD, well-developed  HEAD:  Atraumatic, Normocephalic  EYES: EOMI, PERRLA, conjunctiva and sclera clear  NECK: Supple, No JVD  CHEST/LUNG: Clear to auscultation bilaterally; No wheeze  HEART: Regular rate and rhythm; No murmurs, rubs, or gallops  ABDOMEN: Soft, Nontender, Nondistended; Bowel sounds present  EXTREMITIES:  2+ Peripheral Pulses, No clubbing, cyanosis, or edema  PSYCH: AAOx3  NEUROLOGY: non-focal  SKIN: No rashes or lesions

## 2024-08-19 NOTE — ED ADULT NURSE NOTE - OBJECTIVE STATEMENT
64y M A&O x3 wife at bedside, LAYA from [facility] cancer center in [place]. Presenting to the ER with dehydration and weakness. Recently diagnosed with colorectal CA and recently began chemo. Pt as well endorsed having n/v intermittently with no relief in the last week and as well frequent diarrhea since beginning chemo; pt has also not been eating and having minimal liquid intake. "At this moment not my stomach feels full and I am unable to eat. I have not been taking home meds b/c I don't feel well and can't keep anything down".

## 2024-08-19 NOTE — H&P ADULT - ASSESSMENT
64 male h/o htn, bph, rectal ca on chemo, here with diarrhea, sara, hyponatremia    symptoms likely 2/2 chemo  renal consult apprec  ivf as per renal  immodium prn  serial bmp    rectal ca  onc f/u  hold chemo while in hospital    bph  cont finestride    htn  hold bp meds for now in setting of dehydration    Advanced care planning was discussed with patient and family.  Advanced care planning forms were reviewed and discussed as appropriate.  Differential diagnosis and plan of care discussed with patient after the evaluation.   Pain assessed and judicious use of narcotics when appropriate was discussed.  Importance of Fall prevention discussed.  Counseling on Smoking and Alcohol cessation was offered when appropriate.  Counseling on Diet, exercise, and medication compliance was done.

## 2024-08-19 NOTE — ED PROVIDER NOTE - CLINICAL SUMMARY MEDICAL DECISION MAKING FREE TEXT BOX
Attending Emergency Medicine Physician- Rio Chen MD, FACEP: In this physician's medical judgement based on clinical history and physical exam the patient's signs and symptoms lead to differential diagnoses which includes but is not limited to: sara, diarrhea, dehydration, colitis  Historical features, symptoms, and clinical exam not consistent with: acute abdomen, sepsis  will obtain iv, cbc, cmp, fluids, vbg, analgesia and antiemetic prn, gi therapeutics and reassess  Will follow up on labs, analgesia, imaging, reassess and disposition to the inpatient team as clinically indicated.  *The above represents an initial assessment/impression. Please refer to my progress notes below for potential changes in patient clinical course*   Labs were ordered and independently reviewed by me, Rio Chen MD, FACEP.   sara present  Appropriate medications for the patient's presenting complaints were ordered, and effects were reassessed.  History assisted by wife  patient with colitis on outpatient ct and being treated for it with antibiotics   will admit for sara and dehydration in the setting of colitis

## 2024-08-19 NOTE — ED PROVIDER NOTE - OBJECTIVE STATEMENT
65 y/o female with pmhx of htn, hld, rectal cancer (diagnosed july 2024) presents to the ED sent in by [facility] clinic for dehydration. Patient states that he started chemo a few weeks ago. He is on daily oral chemo. He states that since taking the medication he has had multiple episodes of diarrhea each day. He feels very dehydrated and weak. Also has been having abdominal pains. He has had decreased PO intake. States that he has been trying to take anti-diarrheals which help symptoms mildly. Today went to [facility] clinic for evaluation. Was given IV tylenol, pepcid and fluids. Was sent to hospital for further evaluation. No headaches, fever, chills, chest pain, cough, nausea, vomiting.

## 2024-08-19 NOTE — ED ADULT NURSE NOTE - NSFALLRISKINTERV_ED_ALL_ED
Reason For Visit  YOGI MONZON is a(n) established patient here today for follow-up management of .        Provider Documentation       Consult written for Dr. Obinna Devi MD, ALICIA   [facility], [place] [address]   24 hour phone [phone]    Chief complaint: Right shoulder pain      History of Present Illness  This 79-year-old right-handed male has had about 5 year history of pain in his right shoulder. The pain has been increasing. He often awakens his wife at night to rub the shoulder. This has not been associated with neck pain or numbness nor tingling    Exam:  Here with his wife  Constitutional:: well developed, healthy appearing, sitting without discomfort   Psychological: ORx3, calm and comfortable, asking and answering questions readily, appropriate mood and affect.  Shoulder:  General- appearance is symmetrical of both shoulder  Lymphatics no cervical, clavicular, or axillary mass  Integument- no lesions nor scar nor bruising  Motor- AROM is limited. Lacks external rotation 20Â° compared to the left forward flexion 40Â° internal rotation thumb to S1, biceps intact  crepitus palpable -present  Neuro- light touch present; no focal tenderness by palpation  Vascular- radial pulse regular          History of Present Illness  THis 77 yo r/h male presents with the Right shoulder pain for weeks.unknown injury or trauma Patient had no imaging. Patient was seen on 11/6/17 for RIGHT elbow bursa.      Allergies  Sulfa Drugs  Teveten    Surgical History  History of Knee Surgery    Family History  Family history of Death  Family history of Death    Social History  No alcohol use  Non-smoker (Z78.9)    Results/Data    X-ray: May 29, 2018; [facility], [facility]   Three views shows advanced severe osteoarthritis with bone-on-bone and sclerosis of the humeral head with the glenoid. There is not marked elevation of the humeral head right shoulder.       Assessment   1. Shoulder pain, right (M25.511)   2. Osteoarthritis of right shoulder (M19.011)  -Advanced osteoarthritis  -Long-standing  -Has not had therapy      Plan   · XR SHOULDER RT 3V; Status:Complete;   Done: [id number]  Plan Free Text Ortho: 1. Insert findings Discussed and correlated findings,based on history, and exam; and encouraged and addressed questions.  2. OT/PT  3. Follow-up 1 month, consider steroid injection.      Signatures   Electronically signed by : Keren Tsai CMA; May 29 2018 10:54AM CST    Electronically signed by : KAROLINA PURI MD; May 29 2018 11:27AM CST     Assistance OOB with selected safe patient handling equipment if applicable/Assistance with ambulation/Communicate fall risk and risk factors to all staff, patient, and family/Provide visual cue: yellow wristband, yellow gown, etc/Reinforce activity limits and safety measures with patient and family/Call bell, personal items and telephone in reach/Instruct patient to call for assistance before getting out of bed/chair/stretcher/Non-slip footwear applied when patient is off stretcher/[place] to call system/Physically safe environment - no spills, clutter or unnecessary equipment/Purposeful Proactive Rounding/Room/bathroom lighting operational, light cord in reach

## 2024-08-19 NOTE — CONSULT NOTE ADULT - NS ATTEND AMEND GEN_ALL_CORE FT
pt with rectal cancer on CAPOX, c/b diarrhea  s/p a course of azithromycin,  Here for MICHAEL, dehydration and persistent diarrhea  would recommend lomotil, IVF, electrolytes  capecitabine can cause significant diarrhea , it is on hold  Has high QTc, related to anti-emetics and recent azithromycin use, would monitor

## 2024-08-19 NOTE — H&P ADULT - HISTORY OF PRESENT ILLNESS
65 y/o female with pmhx of htn, hld, rectal cancer (diagnosed july 2024) presents to the ED sent in by [facility] for dehydration. Patient states that he started chemo a few weeks ago. He is on daily oral chemo. He states that since taking the medication he has had multiple episodes of diarrhea each day. He feels very dehydrated and weak. Also has been having abdominal pains. He has had decreased PO intake. States that he has been trying to take anti-diarrheals which help symptoms mildly. Today went to [facility] for evaluation. Was given IV tylenol, pepcid and fluids. Was sent to hospital for further evaluation. No headaches, fever, chills, chest pain, cough, nausea, vomiting. 63 y/o male with HTN, BPH, upper rectal adenocarcinoma, [place] care of [place] Marcello Caballero [place] [facility] [place] treatment with oxaliplatin on 8/2/2024 + capecitabine. He received Venofer x 3, last on 8/16. He presented [place] [facility] [place] earlier due to worsening diarrhea (no blood reported), poor appetite and weight loss. He was last seen in the [facility] 8/14, where CT noted ileitis and colitis. He was given IVF, IV Pepcid, IV Zofran and IV Toradol and subsequently sent home. Stool studies were negative, but he was empirically treated with azithromycin 500mg x 3 days the following day. Last Friday he called to report dizziness and fall with head strike 4am. He  presented to a local ER where he received 2L IV hydration, antiemetics and pain medications. He started Lomotil on Saturday which initially helped but symptoms returned yesterday. Zeloda has been on hold since 8/15.    At urgent care earlier was found to have MICHAEL, gastroenteritis likely medication induced, FTT 2/2 nausea/vomiting and prolong QTc. Patient was +orthostatic on exam, s/p IV zofran and IV famotidine, Na 129 K 3.5 Mag 1.7, BUN/Cr 38/1.7  and s/p 1L IVF. Advised to come to [facility] for further management, IVF, cardiac monitoring, cardiac/GI evaluation.

## 2024-08-19 NOTE — ED PROVIDER NOTE - PHYSICAL EXAMINATION
CONSTITUTIONAL: Patient is awake, alert and oriented x 3.   HEAD: NCAT  EYES: PERRL bilaterally,   ENT: Airway patent, Nasal mucosa clear.   NECK: Supple,   LUNGS: CTA B/L  HEART: RRR.+S1S2  ABDOMEN: Soft, non-tender to palpation throughout all four quadrants,    MSK: FROM upper and lower ext b/l,   SKIN: No rash or lesions  NEURO: No focal deficits,

## 2024-08-19 NOTE — CONSULT NOTE ADULT - ASSESSMENT
65 y/o male with HTN, BPH, upper rectal adenocarcinoma, under care of [place]. Marcello Caballero [place] [facility] presenting with worsening diarrhea, poor appetite and weight loss.    Rectal adenocarcinoma  - [place] w/ [place]. Caballero [place] [facility]; started treatment with oxaliplatin on 8/2/2024 + capecitabine. He received Venofer x 3, last on 8/16.   - He was last seen in the [facility] 8/14, where CT noted ileitis and colitis. He was given IVF, IV Pepcid, IV Zofran and IV Toradol and subsequently sent home. Stool studies were negative, empirically treated with azithromycin 500mg x 3 days. He started Lomotil on Saturday which initially helped but symptoms returned.   - Zeloda has been on hold since 8/15.     MICHAEL, possible gastroenteritis likely medication induced  - Creatinine was 1.3 on 8/14 -> 1.7 earlier today  - Also found to have prolonged QTc  - Recommend IV hydration, cardiac monitoring, consider cardiac/GI evaluation.    Will continue to follow if admitted.     Sharath Flores PA-C  Hematology/Oncology  [facility]  [phone] (office)

## 2024-08-20 NOTE — PHYSICAL THERAPY INITIAL EVALUATION ADULT - GENERAL OBSERVATIONS, REHAB EVAL
Rec'd semi-supine in bed, +IV drip, room air. pt received semi-supine in bed, on room air, A&0x4, following 100% multi-step commands, +IV.

## 2024-08-20 NOTE — PROGRESS NOTE ADULT - SUBJECTIVE AND OBJECTIVE BOX
Patient is a 64y old  Male who presents with a chief complaint of diarrhea, weakness, poor PO intake. Patient seen and examined at bedside, resting comfortably.     MEDICATIONS  (STANDING):  aspirin enteric coated 81 milliGRAM(s) Oral daily  finasteride 5 milliGRAM(s) Oral daily  pantoprazole  Injectable 40 milliGRAM(s) IV Push daily  sodium bicarbonate 1300 milliGRAM(s) Oral two times a day  sucralfate 1 Gram(s) Oral every 6 hours    MEDICATIONS  (PRN):  aluminum hydroxide/magnesium hydroxide/simethicone Suspension 30 milliLiter(s) Oral every 4 hours PRN Dyspepsia  ondansetron Injectable 4 milliGRAM(s) IV Push three times a day PRN Nausea and/or Vomiting  oxyCODONE    IR 5 milliGRAM(s) Oral every 8 hours PRN Moderate Pain (4 - 6)    Vital Signs Last 24 Hrs  T(C): 36.6 (20 Aug 2024 08:27), Max: 37.7 (19 Aug 2024 17:39)  T(F): 97.9 (20 Aug 2024 08:27), Max: 99.8 (19 Aug 2024 17:39)  HR: 80 (20 Aug 2024 08:27) (80 - 95)  BP: 128/87 (20 Aug 2024 08:27) (101/70 - 129/82)  BP(mean): --  RR: 18 (20 Aug 2024 08:27) (16 - 18)  SpO2: 97% (20 Aug 2024 08:27) (95% - 99%)    Parameters below as of 20 Aug 2024 08:27  Patient On (Oxygen Delivery Method): room air    PE  NAD  Awake, alert  Anicteric, MMM  RRR  CTAB  Abd soft, NT, ND  No c/c/e  No rash grossly                        12.8   5.79  )-----------( 468      ( 20 Aug 2024 07:37 )             38.9       08-20    128<L>  |  99  |  34<H>  ----------------------------<  134<H>  3.7   |  18<L>  |  1.16    Ca    8.7      20 Aug 2024 07:36  Phos  5.7     08-19  Mg     2.4     08-19    TPro  5.4<L>  /  Alb  2.9<L>  /  TBili  0.7  /  DBili  x   /  AST  12  /  ALT  11  /  AlkPhos  47  08-19

## 2024-08-20 NOTE — PHYSICAL THERAPY INITIAL EVALUATION ADULT - ADDITIONAL COMMENTS
Pt resides in a pvt home w/ family, 2 steps to enter (+HR), one flight to negotiate inside. PTA pt was independent w/ all mobility & ADL's. Did not use an AD for ambulation. States recently he has only been able to ambulate short distances. Pt resides in a pvt home w/ family, 2 steps to enter (+HR), one flight to negotiate inside; however reporting he can remain on 1st floor if need be. PTA pt was independent w/ all mobility & ADL's. Did not use an AD for ambulation. States recently he has only been able to ambulate short distances.

## 2024-08-20 NOTE — PHYSICAL THERAPY INITIAL EVALUATION ADULT - PERTINENT HX OF CURRENT PROBLEM, REHAB EVAL
64 y.o. M PMH HTN, BPH, upper rectal adenocarcinoma, under care of [place]. Marcello Caballero at [facility], started treatment with oxaliplatin on 8/2/2024 + capecitabine. He received Venofer x 3, last on 8/16. He presented to [facility] urgent care earlier due to worsening diarrhea (no blood reported), poor appetite and weight loss. He was last seen in the [facility] 8/14, where CT noted ileitis and colitis. He was given IVF, IV Pepcid, IV Zofran and IV Toradol and subsequently sent home. Stool studies were negative, but he was empirically treated with azithromycin 500mg x 3 days the following day. Last Friday he called to report dizziness and fall with head strike 4am. He  presented to a local ER where he received 2L IV hydration, antiemetics and pain medications. He started Lomotil on Saturday which initially helped but symptoms returned yesterday. Zeloda has been on hold since 8/15.    At urgent care earlier was found to have MICHAEL, gastroenteritis likely medication induced, FTT 2/2 nausea/vomiting and prolong QTc. Patient was +orthostatic on exam, s/p IV zofran and IV famotidine, Na 129 K 3.5 Mag 1.7, BUN/Cr 38/1.7  and s/p 1L IVF. Advised to come to [facility] for further management, IVF, cardiac monitoring, cardiac/GI evaluation.

## 2024-08-20 NOTE — PHYSICAL THERAPY INITIAL EVALUATION ADULT - NSPTDISCHREC_GEN_A_CORE
TBD pending completion of functional evaluation. Pt is at baseline functionally; requiring no skilled PT needs at this time./No skilled PT needs

## 2024-08-20 NOTE — PROGRESS NOTE ADULT - SUBJECTIVE AND OBJECTIVE BOX
Overnight events noted      VITAL:  T(C): , Max: 37.7 (08-19-24 @ 17:39)  T(F): , Max: 99.8 (08-19-24 @ 17:39)  HR: 80 (08-20-24 @ 08:27)  BP: 128/87 (08-20-24 @ 08:27)  BP(mean): --  RR: 18 (08-20-24 @ 08:27)  SpO2: 97% (08-20-24 @ 08:27)  Wt(kg): --      PHYSICAL EXAM:  Constitutional: NAD, Alert  HEENT: NCAT, MMM  Neck: Supple, No JVD  Respiratory: CTA-b/l  Cardiovascular: RRR s1s2, no m/r/g  Gastrointestinal: BS+, soft, NT/ND  Extremities: No peripheral edema b/l  Neurological: no focal deficits; strength grossly intact  Back: no CVAT b/l  Skin: No rashes, no nevi    LABS:                        12.8   5.79  )-----------( 468      ( 20 Aug 2024 07:37 )             38.9     Na(128)/K(3.7)/Cl(99)/HCO3(18)/BUN(34)/Cr(1.16)Glu(134)/Ca(8.7)/Mg(--)/PO4(--)    08-20 @ 07:36  Na(128)/K(3.7)/Cl(96)/HCO3(16)/BUN(41)/Cr(1.65)Glu(156)/Ca(9.0)/Mg(2.4)/PO4(5.7)    08-19 @ 13:19    Sodium, Random Urine: <5 mmol/L (08-19 @ 20:08)  Creatinine, Random Urine: 197 mg/dL (08-19 @ 20:08)  Protein/Creatinine Ratio Calculation: 0.2 Ratio (08-19 @ 20:08)  Osmolality, Random Urine: 993 mos/kg (08-19 @ 20:08)  Potassium, Random Urine: 52 mmol/L (08-19 @ 20:08)        IMPRESSION: 64M w/ HTN, HLD, and rectal CA on chemo, 8/19/24 p/w diarrhea, orthostatic hypotension, and MICHAEL    (1)Renal - MICHAEL is not from ATN but rather from prerenal azotemia, as evidenced by the urine studies, and by the improvement in the numbers overnight with administration of IVF.     (2)Hyponatremia - low effective arterial blood volume with associated high urine osm. Salt tabs could be worthwhile here to prevent worsening hyponatremia. A fluid restriction would not be worthwhile here; it would increase the likelihood/rapidity of recurrence of intravascular depletion    (3)Metabolic acidosis - largely due to GI losses - slowly improving with IVF with HCO3. Placement on NaHCO3 tabs could help (a)maintain volume status, (b)prevent worsening hyponatremia, and (c)prevent worsening acidosis.    (4)CV - intravascularly depleted/orthostatic      RECOMMEND:  (1)Add NaHCO3 1300mg po bid  (2)Can attempt d/c of IVF  (3)Orthostatics qshift      Julio Ohara MD  [facility]  Office/on call physician: [phone]  Cell (7a-7p): [phone]       Still with diarrhea  (+)mild fatigue/generalized weakness      VITAL:  T(C): , Max: 37.7 (08-19-24 @ 17:39)  T(F): , Max: 99.8 (08-19-24 @ 17:39)  HR: 80 (08-20-24 @ 08:27)  BP: 128/87 (08-20-24 @ 08:27)  BP(mean): --  RR: 18 (08-20-24 @ 08:27)  SpO2: 97% (08-20-24 @ 08:27)  Wt(kg): --      PHYSICAL EXAM:  Constitutional: NAD, Alert  HEENT: NCAT, DMM  Neck: Supple, No JVD  Respiratory: CTA-b/l  Cardiovascular: RRR s1s2, no m/r/g  Gastrointestinal: BS+, soft, NT/ND  Extremities: No peripheral edema b/l  Neurological: no focal deficits; strength grossly intact  Back: no CVAT b/l  Skin: No rashes, no nevi    LABS:                        12.8   5.79  )-----------( 468      ( 20 Aug 2024 07:37 )             38.9     Na(128)/K(3.7)/Cl(99)/HCO3(18)/BUN(34)/Cr(1.16)Glu(134)/Ca(8.7)/Mg(--)/PO4(--)    08-20 @ 07:36  Na(128)/K(3.7)/Cl(96)/HCO3(16)/BUN(41)/Cr(1.65)Glu(156)/Ca(9.0)/Mg(2.4)/PO4(5.7)    08-19 @ 13:19    Sodium, Random Urine: <5 mmol/L (08-19 @ 20:08)  Creatinine, Random Urine: 197 mg/dL (08-19 @ 20:08)  Protein/Creatinine Ratio Calculation: 0.2 Ratio (08-19 @ 20:08)  Osmolality, Random Urine: 993 mos/kg (08-19 @ 20:08)  Potassium, Random Urine: 52 mmol/L (08-19 @ 20:08)        IMPRESSION: 64M w/ HTN, HLD, and rectal CA on chemo, 8/19/24 p/w diarrhea, orthostatic hypotension, and MICHAEL    (1)Renal - MICHAEL is not from ATN but rather from prerenal azotemia, as evidenced by the urine studies, and by the improvement in the numbers overnight with administration of IVF.     (2)Hyponatremia - low effective arterial blood volume with associated high urine osm. Salt tabs could be worthwhile here to prevent worsening hyponatremia. A fluid restriction would not be worthwhile here; it would increase the likelihood/rapidity of recurrence of intravascular depletion    (3)Metabolic acidosis - largely due to GI losses - slowly improving with IVF with HCO3. Placement on NaHCO3 tabs could help (a)maintain volume status, (b)prevent worsening hyponatremia, and (c)prevent worsening acidosis.    (4)CV - intravascularly depleted/orthostatic      RECOMMEND:  (1)Add NaHCO3 1300mg po bid  (2)NS 75cc/h  (3)Orthostatics qshift      Julio Ohara MD  [facility]  Office/on call physician: [phone]  Cell (7a-7p): [phone]

## 2024-08-20 NOTE — PROGRESS NOTE ADULT - SUBJECTIVE AND OBJECTIVE BOX
KIMI FOY  64y Male  MRN:[id number]    Patient is a 64y old  Male who presents with a chief complaint of dehydration      HPI:  65 y/o male with HTN, BPH, upper rectal adenocarcinoma, under care of [place]. Marcello Caballero [place] [facility], started treatment with oxaliplatin on 8/2/2024 + capecitabine. He received Venofer x 3, last on 8/16. He presented to [facility] [place] earlier due to worsening diarrhea (no blood reported), poor appetite and weight loss. He was last seen in the [facility] 8/14, where CT noted ileitis and colitis. He was given IVF, IV Pepcid, IV Zofran and IV Toradol and subsequently sent home. Stool studies were negative, but he was empirically treated with azithromycin 500mg x 3 days the following day. Last Friday he called to report dizziness and fall with head strike 4am. He  presented to a local ER where he received 2L IV hydration, antiemetics and pain medications. He started Lomotil on Saturday which initially helped but symptoms returned yesterday. Zeloda has been on hold since 8/15.    At urgent care earlier was found to have MICHAEL, gastroenteritis likely medication induced, FTT 2/2 nausea/vomiting and prolong QTc. Patient was +orthostatic on exam, s/p IV zofran and IV famotidine, Na 129 K 3.5 Mag 1.7, BUN/Cr 38/1.7  and s/p 1L IVF. Advised to come to [facility] for further management, IVF, cardiac monitoring, cardiac/GI evaluation. (19 Aug 2024 18:14)      Patient seen and evaluated at bedside. No acute events overnight except as noted.    Interval HPI: no acute events o/n     PAST MEDICAL & SURGICAL HISTORY:  Rectal cancer      HTN (hypertension)      High cholesterol      No significant past surgical history          REVIEW OF SYSTEMS:  as per hpi     VITALS:  Vital Signs Last 24 Hrs  T(C): 36.6 (20 Aug 2024 08:27), Max: 37.7 (19 Aug 2024 17:39)  T(F): 97.9 (20 Aug 2024 08:27), Max: 99.8 (19 Aug 2024 17:39)  HR: 80 (20 Aug 2024 08:27) (80 - 95)  BP: 128/87 (20 Aug 2024 08:27) (108/73 - 128/87)  BP(mean): --  RR: 18 (20 Aug 2024 08:27) (16 - 18)  SpO2: 97% (20 Aug 2024 08:27) (95% - 99%)    Parameters below as of 20 Aug 2024 08:27  Patient On (Oxygen Delivery Method): room air      CAPILLARY BLOOD GLUCOSE        I&O's Summary    19 Aug 2024 07:01  -  20 Aug 2024 07:00  --------------------------------------------------------  IN: 120 mL / OUT: 0 mL / NET: 120 mL        PHYSICAL EXAM:  GENERAL: NAD, well-developed  HEAD:  Atraumatic, Normocephalic  EYES: EOMI, PERRLA, conjunctiva and sclera clear  NECK: Supple, No JVD  CHEST/LUNG: Clear to auscultation bilaterally; No wheeze  HEART: S1, S2; No murmurs, rubs, or gallops  ABDOMEN: Soft, Nontender, Nondistended; Bowel sounds present  EXTREMITIES:  2+ Peripheral Pulses, No clubbing, cyanosis, or edema  PSYCH: Normal affect  NEUROLOGY: AAOX3; non-focal  SKIN: No rashes or lesions    Consultant(s) Notes Reviewed:  [x ] YES  [ ] NO  Care Discussed with Consultants/Other Providers [ x] YES  [ ] NO    MEDS:  MEDICATIONS  (STANDING):  aspirin enteric coated 81 milliGRAM(s) Oral daily  finasteride 5 milliGRAM(s) Oral daily  pantoprazole  Injectable 40 milliGRAM(s) IV Push daily  sodium bicarbonate 1300 milliGRAM(s) Oral two times a day  sucralfate 1 Gram(s) Oral every 6 hours    MEDICATIONS  (PRN):  aluminum hydroxide/magnesium hydroxide/simethicone Suspension 30 milliLiter(s) Oral every 4 hours PRN Dyspepsia  ondansetron Injectable 4 milliGRAM(s) IV Push three times a day PRN Nausea and/or Vomiting  oxyCODONE    IR 5 milliGRAM(s) Oral every 8 hours PRN Moderate Pain (4 - 6)    ALLERGIES:  No Known Allergies      LABS:                        12.8   5.79  )-----------( 468      ( 20 Aug 2024 07:37 )             38.9     08-20    128<L>  |  99  |  34<H>  ----------------------------<  134<H>  3.7   |  18<L>  |  1.16    Ca    8.7      20 Aug 2024 07:36  Phos  5.7     08-19  Mg     2.4     08-19    TPro  5.4<L>  /  Alb  2.9<L>  /  TBili  0.7  /  DBili  x   /  AST  12  /  ALT  11  /  AlkPhos  47  08-19          LIVER FUNCTIONS - ( 19 Aug 2024 13:19 )  Alb: 2.9 g/dL / Pro: 5.4 g/dL / ALK PHOS: 47 U/L / ALT: 11 U/L / AST: 12 U/L / GGT: x           Urinalysis Basic - ( 20 Aug 2024 07:36 )    Color: x / Appearance: x / SG: x / pH: x  Gluc: 134 mg/dL / Ketone: x  / Bili: x / Urobili: x   Blood: x / Protein: x / Nitrite: x   Leuk Esterase: x / RBC: x / WBC x   Sq Epi: x / Non Sq Epi: x / Bacteria: x     < from: Xray Chest 1 View AP/PA (08.19.24 @ 15:30) >  IMPRESSION:  Clear lungs.    --- End of Report ---      < end of copied text >

## 2024-08-20 NOTE — PATIENT PROFILE ADULT - FALL HARM RISK - HARM RISK INTERVENTIONS

## 2024-08-21 NOTE — PROGRESS NOTE ADULT - SUBJECTIVE AND OBJECTIVE BOX
Patient is a 64y old  Male who presents with a chief complaint of diarrhea, poor PO intake.    Patient seen and examined at bedside, reports his diarrhea is frequent, he passed 2 loose BM since last night.     MEDICATIONS  (STANDING):  aspirin enteric coated 81 milliGRAM(s) Oral daily  finasteride 5 milliGRAM(s) Oral daily  pantoprazole  Injectable 40 milliGRAM(s) IV Push daily  sodium bicarbonate 1300 milliGRAM(s) Oral two times a day  sodium chloride 0.9%. 1000 milliLiter(s) (75 mL/Hr) IV Continuous <Continuous>  sucralfate 1 Gram(s) Oral every 6 hours    MEDICATIONS  (PRN):  aluminum hydroxide/magnesium hydroxide/simethicone Suspension 30 milliLiter(s) Oral every 4 hours PRN Dyspepsia  ondansetron Injectable 4 milliGRAM(s) IV Push three times a day PRN Nausea and/or Vomiting  oxyCODONE    IR 5 milliGRAM(s) Oral every 8 hours PRN Moderate Pain (4 - 6)    Vital Signs Last 24 Hrs  T(C): 36.4 (21 Aug 2024 08:42), Max: 36.9 (20 Aug 2024 23:52)  T(F): 97.5 (21 Aug 2024 08:42), Max: 98.5 (20 Aug 2024 23:52)  HR: 93 (21 Aug 2024 08:42) (86 - 96)  BP: 132/83 (21 Aug 2024 08:42) (119/88 - 132/83)  BP(mean): --  RR: 18 (21 Aug 2024 08:42) (18 - 18)  SpO2: 98% (21 Aug 2024 08:42) (96% - 98%)    Parameters below as of 21 Aug 2024 08:42  Patient On (Oxygen Delivery Method): room air    PE  NAD  Awake, alert  Anicteric, MMM  Abd soft, NT  No c/c/e  No rash grossly  FROM                        12.8   5.79  )-----------( 468      ( 20 Aug 2024 07:37 )             38.9     08-21    134<L>  |  97  |  34<H>  ----------------------------<  118<H>  4.4   |  20<L>  |  1.20    Ca    8.3<L>      21 Aug 2024 07:28  Phos  5.7     08-19  Mg     2.4     08-19    TPro  5.4<L>  /  Alb  2.9<L>  /  TBili  0.7  /  DBili  x   /  AST  12  /  ALT  11  /  AlkPhos  47  08-19

## 2024-08-21 NOTE — CONSULT NOTE ADULT - SUBJECTIVE AND OBJECTIVE BOX
[facility]    Lane Aj Travis NP    [address] [place] [id number]  [phone]      Chief Complaint:  Patient is a 64y old  Male who presents with a chief complaint of     HPI:    Allergies:  No Known Allergies      Medications:  aluminum hydroxide/magnesium hydroxide/simethicone Suspension 30 milliLiter(s) Oral every 4 hours PRN  aspirin enteric coated 81 milliGRAM(s) Oral daily  finasteride 5 milliGRAM(s) Oral daily  oxyCODONE    IR 5 milliGRAM(s) Oral every 8 hours PRN  pantoprazole  Injectable 40 milliGRAM(s) IV Push daily  sodium bicarbonate 1300 milliGRAM(s) Oral two times a day  sodium chloride 0.9%. 1000 milliLiter(s) IV Continuous <Continuous>  sucralfate 1 Gram(s) Oral every 6 hours      PMHX/PSHX:  Rectal cancer    HTN (hypertension)    High cholesterol    No significant past surgical history        Family history:      Social History:     ROS:     General:  No wt loss, fevers, chills, night sweats, fatigue,   Eyes:  Good vision, no reported pain  ENT:  No sore throat, pain, runny nose, dysphagia  CV:  No pain, palpitations, hypo/hypertension  Resp:  No dyspnea, cough, tachypnea, wheezing  GI:  No pain, No nausea, No vomiting, No diarrhea, No constipation, No weight loss, No fever, No pruritis, No rectal bleeding, No tarry stools, No dysphagia,  :  No pain, bleeding, incontinence, nocturia  Muscle:  No pain, weakness  Neuro:  No weakness, tingling, memory problems  Psych:  No fatigue, insomnia, mood problems, depression  Endocrine:  No polyuria, polydipsia, cold/heat intolerance  Heme:  No petechiae, ecchymosis, easy bruisability  Skin:  No rash, tattoos, scars, edema      PHYSICAL EXAM:   Vital Signs:  Vital Signs Last 24 Hrs  T(C): 36.4 (21 Aug 2024 08:42), Max: 36.9 (20 Aug 2024 23:52)  T(F): 97.5 (21 Aug 2024 08:42), Max: 98.5 (20 Aug 2024 23:52)  HR: 100 (21 Aug 2024 11:31) (86 - 100)  BP: 131/90 (21 Aug 2024 11:31) (119/88 - 132/83)  BP(mean): --  RR: 18 (21 Aug 2024 08:42) (18 - 18)  SpO2: 99% (21 Aug 2024 11:31) (96% - 99%)    Parameters below as of 21 Aug 2024 11:31  Patient On (Oxygen Delivery Method): room air      Daily     Daily Weight in k.8 (21 Aug 2024 08:42)    GENERAL:  Appears stated age, well-groomed, well-nourished, no distress  HEENT:  NC/AT,  conjunctivae clear and pink, no thyromegaly, nodules, adenopathy, no JVD, sclera -anicteric  CHEST:  Full & symmetric excursion, no increased effort, breath sounds clear  HEART:  Regular rhythm, S1, S2, no murmur/rub/S3/S4, no abdominal bruit, no edema  ABDOMEN:  Soft, non-tender, non-distended, normoactive bowel sounds,  no masses ,no hepato-splenomegaly, no signs of chronic liver disease  EXTEREMITIES:  no cyanosis,clubbing or edema  SKIN:  No rash/erythema/ecchymoses/petechiae/wounds/abscess/warm/dry  NEURO:  Alert, oriented, no asterixis, no tremor, no encephalopathy    LABS:                        12.8   5.79  )-----------( 468      ( 20 Aug 2024 07:37 )             38.9     08-21    134<L>  |  97  |  34<H>  ----------------------------<  118<H>  4.4   |  20<L>  |  1.20    Ca    8.3<L>      21 Aug 2024 07:28          Urinalysis Basic - ( 21 Aug 2024 07:28 )    Color: x / Appearance: x / SG: x / pH: x  Gluc: 118 mg/dL / Ketone: x  / Bili: x / Urobili: x   Blood: x / Protein: x / Nitrite: x   Leuk Esterase: x / RBC: x / WBC x   Sq Epi: x / Non Sq Epi: x / Bacteria: x          Imaging:

## 2024-08-21 NOTE — PROGRESS NOTE ADULT - SUBJECTIVE AND OBJECTIVE BOX
Overnight events noted      VITAL:  T(C): , Max: 36.9 (08-20-24 @ 23:52)  T(F): , Max: 98.5 (08-20-24 @ 23:52)  HR: 86 (08-20-24 @ 23:52)  BP: 119/88 (08-20-24 @ 23:52)  BP(mean): --  RR: 18 (08-20-24 @ 23:52)  SpO2: 96% (08-20-24 @ 23:52)  Wt(kg): --      PHYSICAL EXAM:  Constitutional: NAD, Alert  HEENT: NCAT, DMM  Neck: Supple, No JVD  Respiratory: CTA-b/l  Cardiovascular: RRR s1s2, no m/r/g  Gastrointestinal: BS+, soft, NT/ND  Extremities: No peripheral edema b/l  Neurological: no focal deficits; strength grossly intact  Back: no CVAT b/l  Skin: No rashes, no nevi      LABS:                        12.8   5.79  )-----------( 468      ( 20 Aug 2024 07:37 )             38.9     Na(128)/K(3.7)/Cl(99)/HCO3(18)/BUN(34)/Cr(1.16)Glu(134)/Ca(8.7)/Mg(--)/PO4(--)    08-20 @ 07:36  Na(128)/K(3.7)/Cl(96)/HCO3(16)/BUN(41)/Cr(1.65)Glu(156)/Ca(9.0)/Mg(2.4)/PO4(5.7)    08-19 @ 13:19          IMPRESSION: 64M w/ HTN, HLD, and rectal CA on chemo, 8/19/24 p/w diarrhea, orthostatic hypotension, and MICHAEL    (1)Renal - MICHAEL is not from ATN but rather from prerenal azotemia, as evidenced by the urine studies, and by the improvement in the numbers overnight with administration of IVF.     (2)Hyponatremia - low effective arterial blood volume with associated high urine osm. Now on NaHCO3 tabs. Labs pending from today    (3)Metabolic acidosis - largely due to GI losses - now on PO NaHCO3 - labs pending from today    (4)CV - intravascularly depleted. Orthostatic?       RECOMMEND:  (1)BMP+Mg+PO4 daily including today  (2)PO NaHCO3 as ordered  (3)Orthostatics qshift as ordered  (4)Weaning off IVF once diarrhea improved, provided patient is nonorthostatic        Julio Ohara MD  [facility]  Office/on call physician: [phone]  Cell (7a-7p): [phone]       Still with diarrhea  burping improving      VITAL:  T(C): , Max: 36.9 (08-20-24 @ 23:52)  T(F): , Max: 98.5 (08-20-24 @ 23:52)  HR: 86 (08-20-24 @ 23:52)  BP: 119/88 (08-20-24 @ 23:52)  BP(mean): --  RR: 18 (08-20-24 @ 23:52)  SpO2: 96% (08-20-24 @ 23:52)  Wt(kg): --      PHYSICAL EXAM:  Constitutional: NAD, Alert  HEENT: NCAT, DMM  Neck: Supple, No JVD  Respiratory: CTA-b/l  Cardiovascular: RRR s1s2, no m/r/g  Gastrointestinal: BS+, soft, NT/ND  Extremities: No peripheral edema b/l  Neurological: no focal deficits; strength grossly intact  Back: no CVAT b/l  Skin: No rashes, no nevi      LABS:                        12.8   5.79  )-----------( 468      ( 20 Aug 2024 07:37 )             38.9     Na(128)/K(3.7)/Cl(99)/HCO3(18)/BUN(34)/Cr(1.16)Glu(134)/Ca(8.7)/Mg(--)/PO4(--)    08-20 @ 07:36  Na(128)/K(3.7)/Cl(96)/HCO3(16)/BUN(41)/Cr(1.65)Glu(156)/Ca(9.0)/Mg(2.4)/PO4(5.7)    08-19 @ 13:19          IMPRESSION: 64M w/ HTN, HLD, and rectal CA on chemo, 8/19/24 p/w diarrhea, orthostatic hypotension, and MICHAEL    (1)Renal - MICHAEL is not from ATN but rather from prerenal azotemia, as evidenced by the urine studies, and by the improvement in the numbers overnight with administration of IVF.     (2)Hyponatremia - low effective arterial blood volume with associated high urine osm. Now on NaHCO3 tabs. Labs pending from today    (3)Metabolic acidosis - largely due to GI losses - now on PO NaHCO3 - labs pending from today    (4)CV - intravascularly depleted. Orthostatic?       RECOMMEND:  (1)BMP+Mg+PO4 daily including today  (2)PO NaHCO3 as ordered  (3)Orthostatics qshift as ordered  (4)Weaning off IVF once diarrhea improved, provided patient is nonorthostatic  (5)F/U GI input given persistent diarrhea      Julio Ohara MD  [facility]  Office/on call physician: [phone]  Cell (7a-7p): [phone]

## 2024-08-21 NOTE — PROGRESS NOTE ADULT - SUBJECTIVE AND OBJECTIVE BOX
KIMI FOY  64y Male  MRN:[id number]    Patient is a 64y old  Male who presents with a chief complaint of dehydration      HPI:  63 y/o male with HTN, BPH, upper rectal adenocarcinoma, under care of Dr. Marcello Caballero at [facility], started treatment with oxaliplatin on 8/2/2024 + capecitabine. He received Venofer x 3, last on 8/16. [place] [facility] due to worsening diarrhea (no blood reported), poor appetite and weight loss. [facility] where CT noted ileitis and colitis. He was given IVF, IV Pepcid, IV Zofran and IV Toradol and subsequently sent home. Stool studies were negative, but he was empirically treated with azithromycin 500mg x 3 days the following day. Last Friday he called to report dizziness and fall with head strike 4am. He  presented to a local ER where he received 2L IV hydration, antiemetics and pain medications. He started Lomotil on Saturday which initially helped but symptoms returned yesterday. Zeloda has been on hold since 8/15.    At urgent care earlier was found to have MICHAEL, gastroenteritis likely medication induced, FTT 2/2 nausea/vomiting and prolong QTc. Patient was +orthostatic on exam, s/p IV zofran and IV famotidine, Na 129 K 3.5 Mag 1.7, BUN/Cr 38/1.7  and s/p 1L IVF. Advised to come to [facility] for further management, IVF, cardiac monitoring, cardiac/GI evaluation. (19 Aug 2024 18:14)      Patient seen and evaluated at bedside. No acute events overnight except as noted.    Interval HPI: c/o diarrhea     PAST MEDICAL & SURGICAL HISTORY:  Rectal cancer      HTN (hypertension)      High cholesterol      No significant past surgical history          REVIEW OF SYSTEMS:  as per hpi     VITALS:   Vital Signs Last 24 Hrs  T(C): 36.4 (21 Aug 2024 08:42), Max: 36.9 (20 Aug 2024 23:52)  T(F): 97.5 (21 Aug 2024 08:42), Max: 98.5 (20 Aug 2024 23:52)  HR: 100 (21 Aug 2024 11:31) (86 - 100)  BP: 131/90 (21 Aug 2024 11:31) (119/88 - 132/83)  BP(mean): --  RR: 18 (21 Aug 2024 08:42) (18 - 18)  SpO2: 99% (21 Aug 2024 11:31) (96% - 99%)    Parameters below as of 21 Aug 2024 11:31  Patient On (Oxygen Delivery Method): room air       PHYSICAL EXAM:  GENERAL: NAD, well-developed  HEAD:  Atraumatic, Normocephalic  EYES: EOMI, PERRLA, conjunctiva and sclera clear  NECK: Supple, No JVD  CHEST/LUNG: Clear to auscultation bilaterally; No wheeze  HEART: S1, S2; No murmurs, rubs, or gallops  ABDOMEN: Soft, Nontender, Nondistended; Bowel sounds present  EXTREMITIES:  2+ Peripheral Pulses, No clubbing, cyanosis, or edema  PSYCH: Normal affect  NEUROLOGY: AAOX3; non-focal  SKIN: No rashes or lesions    Consultant(s) Notes Reviewed:  [x ] YES  [ ] NO  Care Discussed with Consultants/Other Providers [ x] YES  [ ] NO    MEDS:   MEDICATIONS  (STANDING):  aspirin enteric coated 81 milliGRAM(s) Oral daily  finasteride 5 milliGRAM(s) Oral daily  pantoprazole  Injectable 40 milliGRAM(s) IV Push daily  sodium bicarbonate 1300 milliGRAM(s) Oral two times a day  sodium chloride 0.9%. 1000 milliLiter(s) (75 mL/Hr) IV Continuous <Continuous>  sucralfate 1 Gram(s) Oral every 6 hours      ALLERGIES:  No Known Allergies      LABS:                                          12.8   5.79  )-----------( 468      ( 20 Aug 2024 07:37 )             38.9   08-21    134<L>  |  97  |  34<H>  ----------------------------<  118<H>  4.4   |  20<L>  |  1.20    Ca    8.3<L>      21 Aug 2024 07:28         < from: Xray Chest 1 View AP/PA (08.19.24 @ 15:30) >  IMPRESSION:  Clear lungs.    --- End of Report ---      < end of copied text >

## 2024-08-22 NOTE — CONSULT NOTE ADULT - SUBJECTIVE AND OBJECTIVE BOX
CHIEF COMPLAINT:    HISTORY OF PRESENT ILLNESS:    63 y/o male with HTN, BPH, upper rectal adenocarcinoma, under care of Dr. Marcello Caballero at [place], started treatment with oxaliplatin on 8/2/2024 + capecitabine. He received Venofer x 3, last on 8/16. [place] [facility] [place] due to worsening diarrhea (no blood reported), poor appetite and weight loss. He was last seen in the [facility] 8/14, where CT noted ileitis and colitis. He was given IVF, IV Pepcid, IV Zofran and IV Toradol and subsequently sent home. Stool studies were negative, but he was empirically treated with azithromycin 500mg x 3 days the following day. Last Friday he called to report dizziness and fall with head strike 4am. He  presented to a local ER where he received 2L IV hydration, antiemetics and pain medications. He started Lomotil on Saturday which initially helped but symptoms returned yesterday. Zeloda has been on hold since 8/15.    At urgent care earlier was found to have MICHAEL, gastroenteritis likely medication induced, FTT 2/2 nausea/vomiting and prolong QTc. Patient was +orthostatic on exam, s/p IV zofran and IV famotidine, Na 129 K 3.5 Mag 1.7, BUN/Cr 38/1.7  and s/p 1L IVF. Advised to come to [facility] for further management, IVF, cardiac monitoring, cardiac/GI evaluation.  Foudn  to have an abnormal EKG with prolonged Qtc  NO chest pain shortness of breath   Has hiccups and diarrhea     PAST MEDICAL & SURGICAL HISTORY:  Rectal cancer      HTN (hypertension)      High cholesterol      No significant past surgical history              MEDICATIONS:  aspirin enteric coated 81 milliGRAM(s) Oral daily        oxyCODONE    IR 5 milliGRAM(s) Oral every 8 hours PRN    aluminum hydroxide/magnesium hydroxide/simethicone Suspension 30 milliLiter(s) Oral every 4 hours PRN  pantoprazole  Injectable 40 milliGRAM(s) IV Push daily  sucralfate 1 Gram(s) Oral every 6 hours    finasteride 5 milliGRAM(s) Oral daily    chlorhexidine 2% Cloths 1 Application(s) Topical daily  sodium bicarbonate 1300 milliGRAM(s) Oral two times a day  sodium chloride 0.9%. 1000 milliLiter(s) IV Continuous <Continuous>      FAMILY HISTORY:      SOCIAL HISTORY:    [ ] Non-smoker  [ ] Smoker  [ ] Alcohol    Allergies    No Known Allergies    Intolerances    	    REVIEW OF SYSTEMS:  CONSTITUTIONAL: No fever, weight loss, or fatigue  EYES: No eye pain, visual disturbances, or discharge  ENMT:  No difficulty hearing, tinnitus, vertigo; No sinus or throat pain  NECK: No pain or stiffness  RESPIRATORY: No cough, wheezing, chills or hemoptysis; No Shortness of Breath  CARDIOVASCULAR: No chest pain, palpitations, passing out, dizziness, or leg swelling  GASTROINTESTINAL: No abdominal or epigastric pain. No nausea, vomiting, or hematemesis; +diarrhea or constipation. No melena or hematochezia.  GENITOURINARY: No dysuria, frequency, hematuria, or incontinence  NEUROLOGICAL: No headaches, memory loss, loss of strength, numbness, or tremors  SKIN: No itching, burning, rashes, or lesions   LYMPH Nodes: No enlarged glands  ENDOCRINE: No heat or cold intolerance; No hair loss  MUSCULOSKELETAL: No joint pain or swelling; No muscle, back, or extremity pain  PSYCHIATRIC: No depression, anxiety, mood swings, or difficulty sleeping  HEME/LYMPH: No easy bruising, or bleeding gums  ALLERY AND IMMUNOLOGIC: No hives or eczema	    [ ] All others negative	  [ ] Unable to obtain    PHYSICAL EXAM:  T(C): 36.6 (08-22-24 @ 08:00), Max: 36.8 (08-21-24 @ 17:46)  HR: 94 (08-22-24 @ 08:00) (94 - 105)  BP: 126/93 (08-22-24 @ 08:00) (126/93 - 162/75)  RR: 18 (08-22-24 @ 08:00) (18 - 18)  SpO2: 100% (08-22-24 @ 08:00) (97% - 100%)  Wt(kg): --  I&O's Summary    21 Aug 2024 07:01  -  22 Aug 2024 07:00  --------------------------------------------------------  IN: 1400 mL / OUT: 0 mL / NET: 1400 mL        Appearance: Normal	  HEENT:   Normal oral mucosa, PERRL, EOMI	  Lymphatic: No lymphadenopathy  Cardiovascular: Normal S1 S2, No JVD, No murmurs, No edema  Respiratory: Lungs clear to auscultation	  Psychiatry: A & O x 3, Mood & affect appropriate  Gastrointestinal:  Soft, Non-tender, + BS	  Skin: No rashes, No ecchymoses, No cyanosis	  Neurologic: Non-focal  Extremities: Normal range of motion, No clubbing, cyanosis or edema  Vascular: Peripheral pulses palpable 2+ bilaterally    TELEMETRY: 	    ECG:  	NSR qtc 543 no acute ischemic stt changes   RADIOLOGY:  c< from: CT Chest w/ IV Cont (07.18.24 @ 15:26) >    EXAM: [id number] - CT CHEST IC  - ORDERED BY: JEB HOUSTON    EXAM: [id number] - CT ABDOMEN AND PELVIS OC IC  - ORDERED BY: JEB HOUSTON      PROCEDURE DATE:  07/18/2024           INTERPRETATION:  CLINICAL INFORMATION: Rectal cancer. History of   metastasis. Follow-up. Abdominal pain.    COMPARISON: None.    CONTRAST/COMPLICATIONS:  IV Contrast: Omnipaque 350  90 cc administered   10 cc discarded  Oral Contrast: Omnipaque 300 (accession [id number]), NONE (accession   [id number])  Complications: None reported at time of study completion    PROCEDURE:  CT of the Chest, Abdomen and Pelvis was performed.  Sagittal and coronal reformats were performed.    FINDINGS:  CHEST:  LUNGS AND LARGE AIRWAYS: Patent central airways. No pulmonary nodules.  PLEURA: No pleural effusion.  VESSELS: Aortic calcification. Coronary artery calcification. No evidence   of pulmonary embolism.  HEART: Heart size is normal. No pericardial effusion.  MEDIASTINUM AND RAE: No lymphadenopathy.  CHEST WALL AND LOWER NECK: Multinodular thyroid.    ABDOMEN AND PELVIS:  LIVER: Solitary subcentimeter superior left lobe hypodensity which   measures simple fluid attenuation suggestive of cyst.  BILE DUCTS: Normal caliber.  GALLBLADDER: Within normal limits.  SPLEEN: Within normal limits.  PANCREAS: Within normal limits.  ADRENALS: Within normal limits.  KIDNEYS/URETERS: 8 mm right lower pole partially exophytic hypodense   lesion, consider targeted renal ultrasound. Couple small left renal cysts.    BLADDER: Trabeculated secondary to chronic outlet obstruction.  REPRODUCTIVE ORGANS: Markedly enlarged prostate.    BOWEL: Circumferential high rectosigmoid wall thickening with patent   lumen compatible with known malignancy with extraluminal bulky soft   tissue component. Air-filled large bowel due to recent colonic   insufflation. No bowel obstruction. Appendix is normal.  PERITONEUM/RETROPERITONEUM: Within normal limits.  VESSELS: Atherosclerotic changes.  LYMPH NODES: Mildly prominent external iliac nodes bilaterally, onthe   right 1.3 x 0.8 cm, on the left 1.6 x 0.9 cm. Numerous small lymph nodes   in presacral and distal sigmoid region adjacent to tumor.  ABDOMINAL WALL: Small umbilical hernia. Left fat-containing inguinal   hernia.  BONES: Multiple vertebral bodylucencies concerning for malignancy most   conspicuous at T12, L1, L2. Degenerative Schmorl's nodes.    IMPRESSION:  Rectosigmoid cancer. Suspect regional lymphadenopathy.  Multiple thoracic and lumbar spine vertebral bodies lucent foci, possibly   metastases. Consider MRI or PET/CT.  Indeterminate small right renal lower pole lesion. Consider targeted   renal sonogram for further evaluation.    --- End of Report ---              LINDA EDWARD MD; Resident Radiologist  This document has been electronically signed.  SHA WILLIS MD; Attending Radiologist   This document has been electronically signed. Jul 18 2024  7:26PM    < end of copied text >  C. difficile GDH & toxins A/B by EIA (08.21.24 @ 15:44)   Clostridium difficile GDH Toxins A&B, EIA:   Negative  Clostridium difficile GDH Interpretation: Negative for toxigenic C. Difficile. This specimen is negative for C.   Difficile glutamate dehydrogenase (GDH) antigen and negative for C.   Difficile Toxins A & B, by EIA. GDH is a highly sensitive screening   marker for C. Difficile that is produced in large amounts by all C.   Difficile strains, both toxigenic and nontoxigenic. This assay has not   been validated as a test of cure. Repeat testing during the same episode   of diarrhea is of limited value and is discouraged. The results of this   assay should always be interpreted in conjunction with patient's clinical   history.  OTHER: 	  	  LABS:	 	    CARDIAC MARKERS:    FluA/FluB/RSV/COVID PCR (08.16.24 @ 05:56)   SARS-CoV-2 Result: NotDete: This Respiratory Panel uses polymerase chain reaction (PCR) to detect for   influenza A; influenza B; respiratory syncytial virus; and SARS-CoV-2.          08-21    134<L>  |  97  |  34<H>  ----------------------------<  118<H>  4.4   |  20<L>  |  1.20    Ca    8.3<L>      21 Aug 2024 07:28      proBNP:   Lipid Profile:   HgA1c:   TSH:

## 2024-08-22 NOTE — CONSULT NOTE ADULT - ASSESSMENT
64 male h/o htn, bph, rectal ca on chemo, here with diarrhea, sara, hyponatremia  abnormal EKG with prolonged qtc 543

## 2024-08-22 NOTE — PROGRESS NOTE ADULT - SUBJECTIVE AND OBJECTIVE BOX
Patient is a 64y old  Male who presents with a chief complaint of dehydration    Patient seen and examined.  Resting in bed, reports hiccups  No acute overnight events reported    MEDICATIONS  (STANDING):  aspirin enteric coated 81 milliGRAM(s) Oral daily  chlorhexidine 2% Cloths 1 Application(s) Topical daily  diphenoxylate/atropine 2 Tablet(s) Oral every 6 hours  finasteride 5 milliGRAM(s) Oral daily  gabapentin 300 milliGRAM(s) Oral at bedtime  pantoprazole  Injectable 40 milliGRAM(s) IV Push daily  sodium bicarbonate 1300 milliGRAM(s) Oral two times a day  sodium chloride 0.9%. 1000 milliLiter(s) (75 mL/Hr) IV Continuous <Continuous>  sucralfate suspension 1 Gram(s) Oral every 6 hours    MEDICATIONS  (PRN):  aluminum hydroxide/magnesium hydroxide/simethicone Suspension 30 milliLiter(s) Oral every 4 hours PRN Dyspepsia  chlorproMAZINE    IVPB 25 milliGRAM(s) IV Intermittent every 8 hours PRN hiccups  ondansetron Injectable 4 milliGRAM(s) IV Push every 6 hours PRN Nausea and/or Vomiting  oxyCODONE    IR 5 milliGRAM(s) Oral every 8 hours PRN Moderate Pain (4 - 6)      Vital Signs Last 24 Hrs  T(C): 36.6 (22 Aug 2024 08:00), Max: 36.8 (21 Aug 2024 17:46)  T(F): 97.8 (22 Aug 2024 08:00), Max: 98.3 (21 Aug 2024 17:46)  HR: 94 (22 Aug 2024 08:00) (94 - 105)  BP: 126/93 (22 Aug 2024 08:00) (126/93 - 162/75)  BP(mean): --  RR: 18 (22 Aug 2024 08:00) (18 - 18)  SpO2: 100% (22 Aug 2024 08:00) (97% - 100%)    Parameters below as of 22 Aug 2024 08:00  Patient On (Oxygen Delivery Method): room air        PE  Awake, alert  Anicteric, MMM  RRR  CTAB  Abd soft, NT, ND  No c/c                        14.9   6.25  )-----------( 474      ( 22 Aug 2024 09:55 )             47.2       08-22    131<L>  |  97  |  33<H>  ----------------------------<  138<H>  4.1   |  18<L>  |  1.21    Ca    7.9<L>      22 Aug 2024 09:55

## 2024-08-22 NOTE — PROGRESS NOTE ADULT - SUBJECTIVE AND OBJECTIVE BOX
KIMI FOY  64y Male  MRN:[id number]    Patient is a 64y old  Male who presents with a chief complaint of dehydration      HPI:  65 y/o male with HTN, BPH, upper rectal adenocarcinoma, under care of Dr. Marcello Caballero at [facility], started treatment with oxaliplatin on 8/2/2024 + capecitabine. He received Venofer x 3, last on 8/16. [place] [facility] [place] due to worsening diarrhea (no blood reported), poor appetite and weight loss. He was last seen in the [facility] 8/14, where CT noted ileitis and colitis. He was given IVF, IV Pepcid, IV Zofran and IV Toradol and subsequently sent home. Stool studies were negative, but he was empirically treated with azithromycin 500mg x 3 days the following day. Last Friday he called to report dizziness and fall with head strike 4am. He  presented to a local ER where he received 2L IV hydration, antiemetics and pain medications. He started Lomotil on Saturday which initially helped but symptoms returned yesterday. Zeloda has been on hold since 8/15.    At urgent care earlier was found to have MICHAEL, gastroenteritis likely medication induced, FTT 2/2 nausea/vomiting and prolong QTc. Patient was +orthostatic on exam, s/p IV zofran and IV famotidine, Na 129 K 3.5 Mag 1.7, BUN/Cr 38/1.7  and s/p 1L IVF. Advised to come to [facility] for further management, IVF, cardiac monitoring, cardiac/GI evaluation. (19 Aug 2024 18:14)      Patient seen and evaluated at bedside. No acute events overnight except as noted.    Interval HPI: c/o diarrhea     PAST MEDICAL & SURGICAL HISTORY:  Rectal cancer      HTN (hypertension)      High cholesterol      No significant past surgical history          REVIEW OF SYSTEMS:  as per hpi      Vital Signs Last 24 Hrs  T(C): 36.6 (22 Aug 2024 08:00), Max: 36.8 (21 Aug 2024 17:46)  T(F): 97.8 (22 Aug 2024 08:00), Max: 98.3 (21 Aug 2024 17:46)  HR: 94 (22 Aug 2024 08:00) (94 - 105)  BP: 126/93 (22 Aug 2024 08:00) (126/93 - 162/75)  BP(mean): --  RR: 18 (22 Aug 2024 08:00) (18 - 18)  SpO2: 100% (22 Aug 2024 08:00) (97% - 100%)    Parameters below as of 22 Aug 2024 08:00  Patient On (Oxygen Delivery Method): room air         PHYSICAL EXAM:  GENERAL: NAD, well-developed  HEAD:  Atraumatic, Normocephalic  EYES: EOMI, PERRLA, conjunctiva and sclera clear  NECK: Supple, No JVD  CHEST/LUNG: Clear to auscultation bilaterally; No wheeze  HEART: S1, S2; No murmurs, rubs, or gallops  ABDOMEN: Soft, Nontender, Nondistended; Bowel sounds present  EXTREMITIES:  2+ Peripheral Pulses, No clubbing, cyanosis, or edema  PSYCH: Normal affect  NEUROLOGY: AAOX3; non-focal  SKIN: No rashes or lesions    Consultant(s) Notes Reviewed:  [x ] YES  [ ] NO  Care Discussed with Consultants/Other Providers [ x] YES  [ ] NO    MEDS:   MEDICATIONS  (STANDING):  aspirin enteric coated 81 milliGRAM(s) Oral daily  chlorhexidine 2% Cloths 1 Application(s) Topical daily  diphenoxylate/atropine 2 Tablet(s) Oral every 6 hours  finasteride 5 milliGRAM(s) Oral daily  gabapentin 300 milliGRAM(s) Oral at bedtime  pantoprazole  Injectable 40 milliGRAM(s) IV Push daily  sodium bicarbonate 1300 milliGRAM(s) Oral two times a day  sodium chloride 0.9%. 1000 milliLiter(s) (75 mL/Hr) IV Continuous <Continuous>  sucralfate suspension 1 Gram(s) Oral every 6 hours    MEDICATIONS  (PRN):  aluminum hydroxide/magnesium hydroxide/simethicone Suspension 30 milliLiter(s) Oral every 4 hours PRN Dyspepsia  chlorproMAZINE    IVPB 25 milliGRAM(s) IV Intermittent every 8 hours PRN hiccups  ondansetron Injectable 4 milliGRAM(s) IV Push every 6 hours PRN Nausea and/or Vomiting  oxyCODONE    IR 5 milliGRAM(s) Oral every 8 hours PRN Moderate Pain (4 - 6)    ALLERGIES:  No Known Allergies      LABS:                                                    14.9   6.25  )-----------( 474      ( 22 Aug 2024 09:55 )             47.2   08-22    131<L>  |  97  |  33<H>  ----------------------------<  138<H>  4.1   |  18<L>  |  1.21    Ca    7.9<L>      22 Aug 2024 09:55           < from: Xray Chest 1 View AP/PA (08.19.24 @ 15:30) >  IMPRESSION:  Clear lungs.    --- End of Report ---      < end of copied text >

## 2024-08-22 NOTE — PROGRESS NOTE ADULT - SUBJECTIVE AND OBJECTIVE BOX
Overnight events noted      VITAL:  T(C): , Max: 36.8 (08-21-24 @ 17:46)  T(F): , Max: 98.3 (08-21-24 @ 17:46)  HR: 94 (08-22-24 @ 08:00)  BP: 126/93 (08-22-24 @ 08:00)  BP(mean): --  RR: 18 (08-22-24 @ 08:00)  SpO2: 100% (08-22-24 @ 08:00)  (+)orthostatic as of a.m. 8/21/24    PHYSICAL EXAM:  Constitutional: NAD, Alert  HEENT: NCAT, DMM  Neck: Supple, No JVD  Respiratory: CTA-b/l  Cardiovascular: RRR s1s2, no m/r/g  Gastrointestinal: BS+, soft, NT/ND  Extremities: No peripheral edema b/l  Neurological: no focal deficits; strength grossly intact  Back: no CVAT b/l  Skin: No rashes, no nevi    LABS:    Na(134)/K(4.4)/Cl(97)/HCO3(20)/BUN(34)/Cr(1.20)Glu(118)/Ca(8.3)/Mg(--)/PO4(--)    08-21 @ 07:28  Na(128)/K(3.7)/Cl(99)/HCO3(18)/BUN(34)/Cr(1.16)Glu(134)/Ca(8.7)/Mg(--)/PO4(--)    08-20 @ 07:36  Na(128)/K(3.7)/Cl(96)/HCO3(16)/BUN(41)/Cr(1.65)Glu(156)/Ca(9.0)/Mg(2.4)/PO4(5.7)    08-19 @ 13:19      IMPRESSION: 64M w/ HTN, HLD, and rectal CA on chemo, 8/19/24 p/w diarrhea, orthostatic hypotension, and MICHAEL    (1)Renal - prerenal - resolved    (2)Hyponatremia - much improved as of yesterday a.m., s/p IVF and s/p placement on PO NaHCO3 tabs    (3)Metabolic acidosis - largely due to GI losses - improved with PO NaHCO3    (4)CV - Orthostatic as of yesterday a.m.; receiving IVF      RECOMMEND:  (1)PO NaHCO3 as ordered  (2)Trend orthostatics  (3)Management of diarrhea per primary team/GI  (4)Weaning off IVF once diarrhea improved; if still orthostatic despite resolution of diarrhea and despite administration of IVF, then at that point we could add Midodrine      Julio Ohara MD  [facility]  Office/on call physician: [phone]  Cell ([phone]): [phone]       still (+)diarrhea  burping worsening    VITAL:  T(C): , Max: 36.8 (08-21-24 @ 17:46)  T(F): , Max: 98.3 (08-21-24 @ 17:46)  HR: 94 (08-22-24 @ 08:00)  BP: 126/93 (08-22-24 @ 08:00)  BP(mean): --  RR: 18 (08-22-24 @ 08:00)  SpO2: 100% (08-22-24 @ 08:00)  (+)orthostatic as of a.m. 8/21/24    PHYSICAL EXAM:  Constitutional: NAD, Alert  HEENT: NCAT, DMM  Neck: Supple, No JVD  Respiratory: CTA-b/l  Cardiovascular: RRR s1s2, no m/r/g  Gastrointestinal: BS+, soft, NT/ND  Extremities: No peripheral edema b/l  Neurological: no focal deficits; strength grossly intact  Back: no CVAT b/l  Skin: No rashes, no nevi    LABS:    Na(134)/K(4.4)/Cl(97)/HCO3(20)/BUN(34)/Cr(1.20)Glu(118)/Ca(8.3)/Mg(--)/PO4(--)    08-21 @ 07:28  Na(128)/K(3.7)/Cl(99)/HCO3(18)/BUN(34)/Cr(1.16)Glu(134)/Ca(8.7)/Mg(--)/PO4(--)    08-20 @ 07:36  Na(128)/K(3.7)/Cl(96)/HCO3(16)/BUN(41)/Cr(1.65)Glu(156)/Ca(9.0)/Mg(2.4)/PO4(5.7)    08-19 @ 13:19      IMPRESSION: 64M w/ HTN, HLD, and rectal CA on chemo, 8/19/24 p/w diarrhea, orthostatic hypotension, and MICHAEL    (1)Renal - prerenal - resolved    (2)Hyponatremia - much improved as of yesterday a.m., s/p IVF and s/p placement on PO NaHCO3 tabs    (3)Metabolic acidosis - largely due to GI losses - improved with PO NaHCO3    (4)CV - Orthostatic as of yesterday a.m.; receiving IVF      RECOMMEND:  (1)PO NaHCO3 as ordered  (2)Trend orthostatics  (3)Management of diarrhea per primary team/GI  (4)Weaning off IVF once diarrhea improved; if still orthostatic despite resolution of diarrhea and despite administration of IVF, then at that point we could add Midodrine      Julio Ohara MD  [facility]  Office/on call physician: [phone]  Cell (7a-7e): [phone]

## 2024-08-22 NOTE — CONSULT NOTE ADULT - ASSESSMENT
hiccups  diarrhea  rectal adenoca    reg diet  c diff negative  d/c contact precautions  gi pcr negative  suspect chemotherapy related  resume lomotil 2 tab po q6h  thorazine prn for hiccups  d/w patient  d/w RN

## 2024-08-22 NOTE — CONSULT NOTE ADULT - SUBJECTIVE AND OBJECTIVE BOX
[facility]    Lane Aj Travis NP    [address] [place] [address]  [phone]      Chief Complaint:  Patient is a 64y old  Male who presents with a chief complaint of     HPI: 63 y/o male with HTN, BPH, upper rectal adenocarcinoma, under care of Dr. Marcello Caballero at [facility], started treatment with oxaliplatin on 8/2/2024 + capecitabine. He received Venofer x 3, last on 8/16. He presented to [facility] urgent care earlier due to worsening diarrhea (no blood reported), poor appetite and weight loss    Allergies:  No Known Allergies      Medications:  aluminum hydroxide/magnesium hydroxide/simethicone Suspension 30 milliLiter(s) Oral every 4 hours PRN  aspirin enteric coated 81 milliGRAM(s) Oral daily  chlorhexidine 2% Cloths 1 Application(s) Topical daily  chlorproMAZINE    IVPB 25 milliGRAM(s) IV Intermittent every 8 hours PRN  diphenoxylate/atropine 2 Tablet(s) Oral every 6 hours  finasteride 5 milliGRAM(s) Oral daily  oxyCODONE    IR 5 milliGRAM(s) Oral every 8 hours PRN  pantoprazole  Injectable 40 milliGRAM(s) IV Push daily  sodium bicarbonate 1300 milliGRAM(s) Oral two times a day  sodium chloride 0.9%. 1000 milliLiter(s) IV Continuous <Continuous>  sucralfate 1 Gram(s) Oral every 6 hours      PMHX/PSHX:  Rectal cancer    HTN (hypertension)    High cholesterol    No significant past surgical history        Family history:      Social History:     ROS:     General:  No wt loss, fevers, chills, night sweats, fatigue,   Eyes:  Good vision, no reported pain  ENT:  No sore throat, pain, runny nose, dysphagia  CV:  No pain, palpitations, hypo/hypertension  Resp:  No dyspnea, cough, tachypnea, wheezing  GI:  No pain, No nausea, No vomiting, No diarrhea, No constipation, No weight loss, No fever, No pruritis, No rectal bleeding, No tarry stools, No dysphagia,  :  No pain, bleeding, incontinence, nocturia  Muscle:  No pain, weakness  Neuro:  No weakness, tingling, memory problems  Psych:  No fatigue, insomnia, mood problems, depression  Endocrine:  No polyuria, polydipsia, cold/heat intolerance  Heme:  No petechiae, ecchymosis, easy bruisability  Skin:  No rash, tattoos, scars, edema      PHYSICAL EXAM:   Vital Signs:  Vital Signs Last 24 Hrs  T(C): 36.6 (22 Aug 2024 08:00), Max: 36.8 (21 Aug 2024 17:46)  T(F): 97.8 (22 Aug 2024 08:00), Max: 98.3 (21 Aug 2024 17:46)  HR: 94 (22 Aug 2024 08:00) (94 - 105)  BP: 126/93 (22 Aug 2024 08:00) (126/93 - 162/75)  BP(mean): --  RR: 18 (22 Aug 2024 08:00) (18 - 18)  SpO2: 100% (22 Aug 2024 08:00) (97% - 100%)    Parameters below as of 22 Aug 2024 08:00  Patient On (Oxygen Delivery Method): room air      Daily     Daily     GENERAL:  Appears stated age, well-groomed, well-nourished, no distress  HEENT:  NC/AT,  conjunctivae clear and pink, no thyromegaly, nodules, adenopathy, no JVD, sclera -anicteric  CHEST:  Full & symmetric excursion, no increased effort, breath sounds clear  HEART:  Regular rhythm, S1, S2, no murmur/rub/S3/S4, no abdominal bruit, no edema  ABDOMEN:  Soft, non-tender, non-distended, normoactive bowel sounds,  no masses ,no hepato-splenomegaly, no signs of chronic liver disease  EXTEREMITIES:  no cyanosis,clubbing or edema  SKIN:  No rash/erythema/ecchymoses/petechiae/wounds/abscess/warm/dry  NEURO:  Alert, oriented, no asterixis, no tremor, no encephalopathy    LABS:                        14.9   6.25  )-----------( 474      ( 22 Aug 2024 09:55 )             47.2     08-22    131<L>  |  97  |  33<H>  ----------------------------<  138<H>  4.1   |  18<L>  |  1.21    Ca    7.9<L>      22 Aug 2024 09:55          Urinalysis Basic - ( 22 Aug 2024 09:55 )    Color: x / Appearance: x / SG: x / pH: x  Gluc: 138 mg/dL / Ketone: x  / Bili: x / Urobili: x   Blood: x / Protein: x / Nitrite: x   Leuk Esterase: x / RBC: x / WBC x   Sq Epi: x / Non Sq Epi: x / Bacteria: x          Imaging:

## 2024-08-23 NOTE — PROGRESS NOTE ADULT - SUBJECTIVE AND OBJECTIVE BOX
Subjective: Patient seen and examined. No new events except as noted.     REVIEW OF SYSTEMS:    CONSTITUTIONAL: +weakness, fevers or chills  EYES/ENT: No visual changes;  No vertigo or throat pain   NECK: No pain or stiffness  RESPIRATORY: No cough, wheezing, hemoptysis; No shortness of breath  CARDIOVASCULAR: No chest pain or palpitations  GASTROINTESTINAL: No abdominal or epigastric pain. No nausea, vomiting, or hematemesis; + diarrhea or constipation. No melena or hematochezia.  GENITOURINARY: No dysuria, frequency or hematuria  NEUROLOGICAL: No numbness or weakness  SKIN: No itching, burning, rashes, or lesions   All other review of systems is negative unless indicated above.    MEDICATIONS:  MEDICATIONS  (STANDING):  aspirin enteric coated 81 milliGRAM(s) Oral daily  chlorhexidine 2% Cloths 1 Application(s) Topical daily  diphenoxylate/atropine 2 Tablet(s) Oral every 6 hours  finasteride 5 milliGRAM(s) Oral daily  FIRST- Mouthwash  BLM 10 milliLiter(s) Swish and Spit five times a day  gabapentin 300 milliGRAM(s) Oral at bedtime  pantoprazole  Injectable 40 milliGRAM(s) IV Push daily  sodium bicarbonate 1300 milliGRAM(s) Oral two times a day  sodium bicarbonate  Infusion 0.133 mEq/kG/Hr (125 mL/Hr) IV Continuous <Continuous>  sucralfate suspension 1 Gram(s) Oral every 6 hours      PHYSICAL EXAM:  T(C): 36.5 (08-23-24 @ 08:00), Max: 36.7 (08-23-24 @ 00:10)  HR: 117 (08-23-24 @ 08:00) (87 - 118)  BP: 99/74 (08-23-24 @ 08:00) (99/74 - 117/84)  RR: 17 (08-23-24 @ 08:00) (17 - 18)  SpO2: 94% (08-23-24 @ 08:00) (92% - 98%)  Wt(kg): --  I&O's Summary    22 Aug 2024 07:01  -  23 Aug 2024 07:00  --------------------------------------------------------  IN: 1050 mL / OUT: 0 mL / NET: 1050 mL          Appearance: Normal	  HEENT:   Normal oral mucosa, PERRL, EOMI	  Lymphatic: No lymphadenopathy , no edema  Cardiovascular: Normal S1 S2, No JVD, No murmurs , Peripheral pulses palpable 2+ bilaterally  Respiratory: Lungs clear to auscultation, normal effort 	  Gastrointestinal:  Soft, Non-tender, + BS	  Skin: No rashes, No ecchymoses, No cyanosis, warm to touch  Musculoskeletal: Normal range of motion, normal strength  Psychiatry:  Mood & affect appropriate  Ext: No edema      LABS:    CARDIAC MARKERS:      Magnesium: 2.4: For pregnant women undergoing magnesium therapy (MgSO4), the therapeutic                           15.0   7.74  )-----------( 432      ( 23 Aug 2024 07:47 )             49.2     08-23    128<L>  |  95<L>  |  38<H>  ----------------------------<  159<H>  4.7   |  11<L>  |  1.73<H>    Ca    7.7<L>      23 Aug 2024 07:47    TPro  5.6<L>  /  Alb  2.5<L>  /  TBili  1.6<H>  /  DBili  x   /  AST  36  /  ALT  37  /  AlkPhos  91  08-23    proBNP:   Lipid Profile:   HgA1c:   TSH:             TELEMETRY: 	    ECG:  	  RADIOLOGY:   DIAGNOSTIC TESTING:  [ ] Echocardiogram:  [ ]  Catheterization:  [ ] Stress Test:    OTHER:

## 2024-08-23 NOTE — PROVIDER CONTACT NOTE (OTHER) - REASON
Patient complaining of more frequent burping and diarrhea
Patient;s  BP: 85/60 hr:120 pt feels generalized weakness

## 2024-08-23 NOTE — PROVIDER CONTACT NOTE (OTHER) - SITUATION
Patient  complaining of more frequent burping and diarrhea.
Patient;s  BP: 85/60 hr:120 pt feels generalized weakness

## 2024-08-23 NOTE — PROGRESS NOTE ADULT - SUBJECTIVE AND OBJECTIVE BOX
[facility]      Lane Travis NP    [address] [place] [address]  [phone]      INTERVAL HPI/OVERNIGHT EVENTS:    patient s/e  tired and weak  c/o mouth sores      MEDICATIONS  (STANDING):  aspirin enteric coated 81 milliGRAM(s) Oral daily  chlorhexidine 2% Cloths 1 Application(s) Topical daily  diphenoxylate/atropine 2 Tablet(s) Oral every 6 hours  finasteride 5 milliGRAM(s) Oral daily  FIRST- Mouthwash  BLM 10 milliLiter(s) Swish and Spit five times a day  gabapentin 300 milliGRAM(s) Oral at bedtime  pantoprazole  Injectable 40 milliGRAM(s) IV Push daily  sodium bicarbonate 1300 milliGRAM(s) Oral two times a day  sodium bicarbonate  Infusion 0.133 mEq/kG/Hr (125 mL/Hr) IV Continuous <Continuous>  sucralfate suspension 1 Gram(s) Oral every 6 hours    MEDICATIONS  (PRN):  aluminum hydroxide/magnesium hydroxide/simethicone Suspension 30 milliLiter(s) Oral every 4 hours PRN Dyspepsia  chlorproMAZINE    IVPB 25 milliGRAM(s) IV Intermittent every 8 hours PRN hiccups  ondansetron Injectable 4 milliGRAM(s) IV Push every 6 hours PRN Nausea and/or Vomiting  oxyCODONE    IR 5 milliGRAM(s) Oral every 8 hours PRN Moderate Pain (4 - 6)      Allergies    No Known Allergies    Intolerances        ROS:   General:  No  fevers, chills, night sweats, fatigue,   Eyes:  Good vision, no reported pain  ENT:  No sore throat, pain, runny nose, dysphagia  CV:  No pain, palpitations, hypo/hypertension  Resp:  No dyspnea, cough, tachypnea, wheezing  GI:  No pain, No nausea, No vomiting, No diarrhea, No constipation, No weight loss, No fever, No pruritis, No rectal bleeding, No tarry stools, No dysphagia,  :  No pain, bleeding, incontinence, nocturia  Muscle:  No pain, weakness  Neuro:  No weakness, tingling, memory problems  Psych:  No fatigue, insomnia, mood problems, depression  Endocrine:  No polyuria, polydipsia, cold/heat intolerance  Heme:  No petechiae, ecchymosis, easy bruisability  Skin:  No rash, tattoos, scars, edema      PHYSICAL EXAM:   Vital Signs:  Vital Signs Last 24 Hrs  T(C): 36.5 (23 Aug 2024 08:00), Max: 36.7 (23 Aug 2024 00:10)  T(F): 97.7 (23 Aug 2024 08:00), Max: 98.1 (23 Aug 2024 00:10)  HR: 117 (23 Aug 2024 08:00) (87 - 118)  BP: 99/74 (23 Aug 2024 08:00) (99/74 - 117/84)  BP(mean): --  RR: 17 (23 Aug 2024 08:00) (17 - 18)  SpO2: 94% (23 Aug 2024 08:00) (92% - 98%)    Parameters below as of 23 Aug 2024 08:00  Patient On (Oxygen Delivery Method): room air      Daily     Daily     GENERAL:  Appears stated age,   HEENT:  NC/AT,    CHEST:  Full & symmetric excursion,   HEART:  Regular rhythm,  ABDOMEN:  Soft, non-tender, non-distended,  EXTEREMITIES:  no cyanosis  SKIN:  No rash  NEURO:  Alert,       LABS:                        15.0   7.74  )-----------( 432      ( 23 Aug 2024 07:47 )             49.2     08-23    128<L>  |  95<L>  |  38<H>  ----------------------------<  159<H>  4.7   |  11<L>  |  1.73<H>    Ca    7.7<L>      23 Aug 2024 07:47    TPro  5.6<L>  /  Alb  2.5<L>  /  TBili  1.6<H>  /  DBili  x   /  AST  36  /  ALT  37  /  AlkPhos  91  08-23      Urinalysis Basic - ( 23 Aug 2024 07:47 )    Color: x / Appearance: x / SG: x / pH: x  Gluc: 159 mg/dL / Ketone: x  / Bili: x / Urobili: x   Blood: x / Protein: x / Nitrite: x   Leuk Esterase: x / RBC: x / WBC x   Sq Epi: x / Non Sq Epi: x / Bacteria: x        RADIOLOGY & ADDITIONAL TESTS:

## 2024-08-23 NOTE — RAPID RESPONSE TEAM SUMMARY - [id number]_GEN_ALL_CORE
Upon arrival, pt alert and oriented, mildly increased work of breathing but able to speak comfortably and answer questions. Loud gargling noises heard, seemingly from lower airways. Pt sitting upright. Pt denied LH/dizziness, cp. Unable to cough up anything and suction minimally productive. CXR taken. Suspicion for pulm edema given aggressive fluids so fluids stopped. Considered iv lasix however BPs low, mostly around 80s/40s, as low as 60s/40s. Albumin given to support BP in anticipation of possible diuresis/NIPPV. Due to work of breathing and mild hypoxia, BIPAP initiated. MICU called and arrived bedside, recommended d/c BIPAP and urgent CT scan for severe metabolic acidosis/low bicarb, concern for ?abdominal process. Pt transported to CT scan on low amount levophed. Pt aspirated while laying flat for CT scan and coded, Code Blue initiated, ROSC achieved, transferred to MICU.

## 2024-08-23 NOTE — CHART NOTE - NSCHARTNOTEFT_GEN_A_CORE
MICU ACCEPT NOTE    CHIEF COMPLAINT: Patient is a 64y old  Male who presents with a chief complaint of     HPI:  65 y/o male with HTN, BPH, upper rectal adenocarcinoma, under care of Dr. Marcello Caballero at [facility] [place], started treatment with oxaliplatin on 8/2/2024 + capecitabine. He received Venofer x 3, last on 8/16. He presented to [facility] [place] urgent care earlier due to worsening diarrhea (no blood reported), poor appetite and weight loss. He was last seen in the [facility] 8/14, where CT noted ileitis and colitis. He was given IVF, IV Pepcid, IV Zofran and IV Toradol and subsequently sent home. Stool studies were negative, but he was empirically treated with azithromycin 500mg x 3 days the following day. Last Friday he called to report dizziness and fall with head strike 4am. He  presented to a local ER where he received 2L IV hydration, antiemetics and pain medications. He started Lomotil on Saturday which initially helped but symptoms returned yesterday. Zeloda has been on hold since 8/15.    At urgent care earlier was found to have MICHAEL, gastroenteritis likely medication induced, FTT 2/2 nausea/vomiting and prolong QTc. Patient was +orthostatic on exam, s/p IV zofran and IV famotidine, Na 129 K 3.5 Mag 1.7, BUN/Cr 38/1.7  and s/p 1L IVF. Advised to come to [facility] for further management, IVF, cardiac monitoring, cardiac/GI evaluation. (19 Aug 2024 18:14)    RRT was called for hypoxia/respiratory distress to 80s. Pt was found to be gargling, hypotensive to 60/40.     metabolic acidosis, bicarb 11 AG pH 7.05  bicarb 18 at 10am    cxr showing elev hemidiaphragm with shadow  with recent colonscopy?         Accepted to MICU for need of diuresis with pressor.     PAST MEDICAL & SURGICAL HISTORY:  Rectal cancer  HTN (hypertension)  High cholesterol    No significant past surgical history    FAMILY HISTORY:    SOCIAL HISTORY:  Substance Use: no    MEDICATIONS  (STANDING):  albumin human 25% IVPB 50 milliLiter(s) IV Intermittent once  albuterol/ipratropium for Nebulization. 3 milliLiter(s) Nebulizer once  aspirin enteric coated 81 milliGRAM(s) Oral daily  chlorhexidine 2% Cloths 1 Application(s) Topical daily  diphenoxylate/atropine 2 Tablet(s) Oral every 6 hours  finasteride 5 milliGRAM(s) Oral daily  FIRST- Mouthwash  BLM 10 milliLiter(s) Swish and Spit five times a day  furosemide   Injectable 20 milliGRAM(s) IV Push once  norepinephrine Infusion 0.05 MICROgram(s)/kG/Min (6.59 mL/Hr) IV Continuous <Continuous>  pantoprazole  Injectable 40 milliGRAM(s) IV Push daily  sodium bicarbonate 1300 milliGRAM(s) Oral two times a day  sodium bicarbonate  Infusion 0.133 mEq/kG/Hr (125 mL/Hr) IV Continuous <Continuous>  sucralfate suspension 1 Gram(s) Oral every 6 hours    MEDICATIONS  (PRN):  aluminum hydroxide/magnesium hydroxide/simethicone Suspension 30 milliLiter(s) Oral every 4 hours PRN Dyspepsia  chlorproMAZINE    IVPB 25 milliGRAM(s) IV Intermittent every 8 hours PRN hiccups  ondansetron Injectable 4 milliGRAM(s) IV Push every 6 hours PRN Nausea and/or Vomiting  oxyCODONE    IR 5 milliGRAM(s) Oral every 8 hours PRN Moderate Pain (4 - 6)      Allergies  No Known Allergies  Intolerances    REVIEW OF SYSTEMS:  CONSTITUTIONAL: No weakness, fevers or chills  EYES/ENT: No visual changes;  No vertigo or throat pain   NECK: No pain or stiffness  RESPIRATORY: No cough, wheezing, hemoptysis; +shortness of breath  CARDIOVASCULAR: No chest pain or palpitations  GASTROINTESTINAL: No abdominal or epigastric pain. No nausea, vomiting, or hematemesis; No diarrhea or constipation. No melena or hematochezia.  GENITOURINARY: No dysuria, frequency or hematuria  NEUROLOGICAL: No numbness or weakness  SKIN: No itching, rashes  [ ] All other systems negative  [ ] Unable to assess ROS because ________    OBJECTIVE:  ICU Vital Signs Last 24 Hrs  T(C): 36.4 (23 Aug 2024 17:24), Max: 36.9 (23 Aug 2024 12:53)  T(F): 97.6 (23 Aug 2024 17:24), Max: 98.4 (23 Aug 2024 12:53)  HR: 115 (23 Aug 2024 17:24) (87 - 120)  BP: 100/62 (23 Aug 2024 18:00) (85/60 - 128/74)  BP(mean): --  ABP: --  ABP(mean): --  RR: 18 (23 Aug 2024 17:24) (17 - 20)  SpO2: 95% (23 Aug 2024 17:24) (92% - 95%)    O2 Parameters below as of 23 Aug 2024 17:24  Patient On (Oxygen Delivery Method): room air              08-22 @ 07:01  -  08-23 @ 07:00  --------------------------------------------------------  IN: 1050 mL / OUT: 0 mL / NET: 1050 mL      CAPILLARY BLOOD GLUCOSE      POCT Blood Glucose.: 154 mg/dL (23 Aug 2024 20:45)      PHYSICAL EXAM:  GENERAL: NAD, lying in bed comfortably  HEAD:  Atraumatic, normocephalic  EYES: EOMI, PERRLA, conjunctiva and sclera clear  ENT: Moist mucous membranes  NECK: Supple, no JVD  HEART: S1, S2, regular rate and rhythm, no murmurs, rubs, or gallops  LUNGS: Unlabored respirations.  Clear to auscultation bilaterally, no crackles, wheezing, or rhonchi  ABDOMEN: Soft, nontender, nondistended, +BS  EXTREMITIES: 2+ peripheral pulses bilaterally. No clubbing, cyanosis, or edema  NERVOUS SYSTEM:  A&Ox3, no focal deficits   SKIN: No rashes or lesions  LINES:     LABS:                        15.0   7.74  )-----------( 432      ( 23 Aug 2024 07:47 )             49.2     Hgb Trend: 15.0<--, 14.9<--, 12.8<--, 14.0<--  08-23    128<L>  |  95<L>  |  38<H>  ----------------------------<  159<H>  4.7   |  11<L>  |  1.73<H>    Ca    7.7<L>      23 Aug 2024 07:47    TPro  5.6<L>  /  Alb  2.5<L>  /  TBili  1.6<H>  /  DBili  x   /  AST  36  /  ALT  37  /  AlkPhos  91  08-23    Creatinine Trend: 1.73<--, 1.21<--, 1.20<--, 1.16<--, 1.65<--, 1.77<--    Urinalysis Basic - ( 23 Aug 2024 07:47 )    Color: x / Appearance: x / SG: x / pH: x  Gluc: 159 mg/dL / Ketone: x  / Bili: x / Urobili: x   Blood: x / Protein: x / Nitrite: x   Leuk Esterase: x / RBC: x / WBC x   Sq Epi: x / Non Sq Epi: x / Bacteria: x        Venous Blood Gas:  08-23 @ 21:33  7.05/29/245/8/95.4  VBG Lactate: --      MICROBIOLOGY:     RADIOLOGY & ADDITIONAL TESTS:    ASSESSMENT  VIRAJ FOY is a 64y male with PMH of HTN, BPH, upper rectal adenocarcinoma, under care of Dr. Marcello Caballero at [facility] [place], started treatment with oxaliplatin on 8/2/2024 + capecitabine. He received Venofer x 3, last on 8/16, in the hospital for 4d transferred to the MICU for ***.    PLAN  =====Neurologic=====  Patient is AOx4    =====Pulmonary=====  #Hypoxia  - NS or HFNS, no indication for BiPaP at this time  - CXR showing lucency over the left hemidiaphragm is likely related to overlying bowel.   - No concerns for flash      =====Cardiovascular=====  #hypotension  - EKG  -ProBNP, trop  - POCUS      =====GI=====  #Rectal CA  - Follow oncology   - Currently held chemo while in the hospital  #distended bowel  #Colitis  - Protonix 40mg IV QD    #Diet  - NPO except medication    =====Renal/=====  #Metabolic acidosis with respiratory compensation  - vBG during rapid showing pH 7.05, PCO2 29, pO2 245, HCO3 8, total CO2 9.  - Bicarb 11, AG of 22 at 7:47 earlier today, acute change  hidden cause such as lactate  - septic etiology from intraabdominal process  - bicarb drip with goal 7.2 pH,  150cc/hr  - maintenance fluid     #MICHAEL  - secondary to chemo - Oxaliplatin   - sCr 1.73, BUN 38, GFR 43.    #Electrolytes  - Maintain K>4, Phos>3, Mag>2, iCal>1    #BPH  - C/w Finestride    =====Endocrine=====  #DM2  - While NPO, FSBG and AUDREY q6h    =====Infectious Disease=====  Patient is afebrile and is not currently being treated for any infection.  - Infectious workup: blood culture x2, sputum culture, urine culture, and UA     =====Heme/Onc=====  #DVT Ppx  - Heparin SQ q8  - SCD    =====Ethics=====  FULL CODE MICU ACCEPT NOTE    CHIEF COMPLAINT: Patient is a 64y old  Male who presents with a chief complaint of MICHAEL.     HPI:  63 y/o male with HTN, BPH, upper rectal adenocarcinoma, under care of Dr. Marcello Caballero at [facility] [place], started treatment with oxaliplatin on 8/2/2024 + capecitabine. He received Venofer x 3, last on 8/16. He presented to [facility] [place] [place] urgent care earlier due to worsening diarrhea (no blood reported), poor appetite and weight loss. He was last seen in the [facility] 8/14, where CT noted ileitis and colitis. He was given IVF, IV Pepcid, IV Zofran and IV Toradol and subsequently sent home. Stool studies were negative, but he was empirically treated with azithromycin 500mg x 3 days the following day. Last Friday he called to report dizziness and fall with head strike 4am. He  presented to a local ER where he received 2L IV hydration, antiemetics and pain medications. He started Lomotil on Saturday which initially helped but symptoms returned yesterday. Zeloda has been on hold since 8/15.    At urgent care earlier was found to have MICHAEL, gastroenteritis likely medication induced, FTT 2/2 nausea/vomiting and prolong QTc. Patient was +orthostatic on exam, s/p IV zofran and IV famotidine, Na 129 K 3.5 Mag 1.7, BUN/Cr 38/1.7  and s/p 1L IVF. Advised to come to [facility] for further management, IVF, cardiac monitoring, cardiac/GI evaluation. (19 Aug 2024 18:14)    RRT was called for hypoxia/respiratory distress to 80s. Pt was on BiPaP, exam significant for gargling sounds and distended abdomen. BiPaP was dc-ed immediately. Patient's lab during RRT showed metabolic acidosis to pH of 7.05 and PCO2 of 29, total CO2 9, HCO3 of 8. CXR showing lucency over the left hemidiaphragm is likely related to overlying bowel. No concern for flash. Patient was accepted to MICU for further management.     Code blue was called while getting CT.     PAST MEDICAL & SURGICAL HISTORY:  Rectal cancer  HTN (hypertension)  High cholesterol    No significant past surgical history    FAMILY HISTORY:    SOCIAL HISTORY:  Substance Use: no    MEDICATIONS  (STANDING):  albumin human 25% IVPB 50 milliLiter(s) IV Intermittent once  albuterol/ipratropium for Nebulization. 3 milliLiter(s) Nebulizer once  aspirin enteric coated 81 milliGRAM(s) Oral daily  chlorhexidine 2% Cloths 1 Application(s) Topical daily  diphenoxylate/atropine 2 Tablet(s) Oral every 6 hours  finasteride 5 milliGRAM(s) Oral daily  FIRST- Mouthwash  BLM 10 milliLiter(s) Swish and Spit five times a day  furosemide   Injectable 20 milliGRAM(s) IV Push once  norepinephrine Infusion 0.05 MICROgram(s)/kG/Min (6.59 mL/Hr) IV Continuous <Continuous>  pantoprazole  Injectable 40 milliGRAM(s) IV Push daily  sodium bicarbonate 1300 milliGRAM(s) Oral two times a day  sodium bicarbonate  Infusion 0.133 mEq/kG/Hr (125 mL/Hr) IV Continuous <Continuous>  sucralfate suspension 1 Gram(s) Oral every 6 hours    MEDICATIONS  (PRN):  aluminum hydroxide/magnesium hydroxide/simethicone Suspension 30 milliLiter(s) Oral every 4 hours PRN Dyspepsia  chlorproMAZINE    IVPB 25 milliGRAM(s) IV Intermittent every 8 hours PRN hiccups  ondansetron Injectable 4 milliGRAM(s) IV Push every 6 hours PRN Nausea and/or Vomiting  oxyCODONE    IR 5 milliGRAM(s) Oral every 8 hours PRN Moderate Pain (4 - 6)      Allergies  No Known Allergies  Intolerances    REVIEW OF SYSTEMS:  CONSTITUTIONAL: No weakness, fevers or chills  EYES/ENT: No visual changes;  No vertigo or throat pain   NECK: No pain or stiffness  RESPIRATORY: +shortness of breath  CARDIOVASCULAR: No chest pain or palpitations  GASTROINTESTINAL: No abdominal or epigastric pain. +Abd distention   GENITOURINARY: No dysuria, frequency or hematuria  NEUROLOGICAL: No numbness or weakness  SKIN: No itching, rashes  [ ] All other systems negative  [ ] Unable to assess ROS because ________    OBJECTIVE:  ICU Vital Signs Last 24 Hrs  T(C): 36.4 (23 Aug 2024 17:24), Max: 36.9 (23 Aug 2024 12:53)  T(F): 97.6 (23 Aug 2024 17:24), Max: 98.4 (23 Aug 2024 12:53)  HR: 115 (23 Aug 2024 17:24) (87 - 120)  BP: 100/62 (23 Aug 2024 18:00) (85/60 - 128/74)  BP(mean): --  ABP: --  ABP(mean): --  RR: 18 (23 Aug 2024 17:24) (17 - 20)  SpO2: 95% (23 Aug 2024 17:24) (92% - 95%)    O2 Parameters below as of 23 Aug 2024 17:24  Patient On (Oxygen Delivery Method): room air              08-22 @ 07:01  -  08-23 @ 07:00  --------------------------------------------------------  IN: 1050 mL / OUT: 0 mL / NET: 1050 mL      CAPILLARY BLOOD GLUCOSE      POCT Blood Glucose.: 154 mg/dL (23 Aug 2024 20:45)      PHYSICAL EXAM:  GENERAL:   HEAD:  Atraumatic, normocephalic  EYES: EOMI, PERRLA, conjunctiva and sclera clear  ENT: Moist mucous membranes  NECK: Supple, no JVD  HEART: S1, S2, regular rate and rhythm, no murmurs, rubs, or gallops  LUNGS: +gargling sounds b/l  ABDOMEN: Soft, nontender, +distended, +BS  EXTREMITIES: 2+ peripheral pulses bilaterally. No clubbing, cyanosis, or edema  NERVOUS SYSTEM:  A&Ox3, no focal deficits   SKIN: No rashes or lesions    LABS:                        15.0   7.74  )-----------( 432      ( 23 Aug 2024 07:47 )             49.2     Hgb Trend: 15.0<--, 14.9<--, 12.8<--, 14.0<--  08-23    128<L>  |  95<L>  |  38<H>  ----------------------------<  159<H>  4.7   |  11<L>  |  1.73<H>    Ca    7.7<L>      23 Aug 2024 07:47    TPro  5.6<L>  /  Alb  2.5<L>  /  TBili  1.6<H>  /  DBili  x   /  AST  36  /  ALT  37  /  AlkPhos  91  08-23    Creatinine Trend: 1.73<--, 1.21<--, 1.20<--, 1.16<--, 1.65<--, 1.77<--    Urinalysis Basic - ( 23 Aug 2024 07:47 )    Color: x / Appearance: x / SG: x / pH: x  Gluc: 159 mg/dL / Ketone: x  / Bili: x / Urobili: x   Blood: x / Protein: x / Nitrite: x   Leuk Esterase: x / RBC: x / WBC x   Sq Epi: x / Non Sq Epi: x / Bacteria: x        Venous Blood Gas:  08-23 @ 21:33  7.05/29/245/8/95.4  VBG Lactate: --      MICROBIOLOGY:     RADIOLOGY & ADDITIONAL TESTS:    ASSESSMENT  VIRAJ FOY is a 64y male with PMH of HTN, BPH, upper rectal adenocarcinoma, under care of Dr. Marcello Caballero at [facility] [place], started treatment with oxaliplatin on 8/2/2024 + capecitabine. He received Venofer x 3, last on 8/16, in the hospital for 4d transferred to the MICU for management of metabolic acidosis and respiratory distress.    PLAN  =====Neurologic=====  Patient is AOx4    =====Pulmonary=====  #Hypoxia  - NS or HFNS, no indication for BiPaP at this time  - CXR showing lucency over the left hemidiaphragm is likely related to overlying bowel. No concerns for flash      =====Cardiovascular=====  - EKG  -ProBNP, trop  - POCUS      =====GI=====  #Rectal CA  - Follow oncology   - Currently held chemo while in the hospital    #Distended bowel  #Colitis  - Protonix 40mg IV QD    #Diet  - NPO except medication    =====Renal/=====  #Metabolic acidosis with respiratory compensation  - vBG during rapid showing pH 7.05, PCO2 29, pO2 245, HCO3 8, total CO2 9.  - Bicarb 11, AG of 22 at 7:47 earlier today, acute change from prior lab, may be 2/2 hidden cause such as lactate. Will repeat labs  - Septic etiology from intraabdominal process leading to aspiration  - bicarb drip 150cc/hr with goal 7.2 pH  - Maintenance fluid     #MICHAEL  - secondary to chemo - Oxaliplatin   - sCr 1.73, BUN 38, GFR 43.    #Electrolytes  - Maintain K>4, Phos>3, Mag>2, iCal>1    #BPH  - C/w Finestride    =====Endocrine=====  #DM2  - While NPO, FSBG and AUDREY q6h    =====Infectious Disease=====  - High chance of aspiration   - Start abx for empiric coverage.  - Infectious workup: blood culture x2, sputum culture, urine culture, and UA     =====Heme/Onc=====  #DVT Ppx  - Heparin SQ q8  - SCD    =====Ethics=====  FULL CODE MICU ACCEPT NOTE    CHIEF COMPLAINT: Patient is a 64y old  Male who presents with a chief complaint of MICHAEL.     HPI:  65 y/o male with HTN, BPH, upper rectal adenocarcinoma, under care of Dr. Marcello Caballero at [facility] [place], started treatment with oxaliplatin on 8/2/2024 + capecitabine. He received Venofer x 3, last on 8/16. He presented to [facility] [place] urgent care earlier due to worsening diarrhea (no blood reported), poor appetite and weight loss. He was last seen in the [facility] 8/14, where CT noted ileitis and colitis. He was given IVF, IV Pepcid, IV Zofran and IV Toradol and subsequently sent home. Stool studies were negative, but he was empirically treated with azithromycin 500mg x 3 days the following day. Last Friday he called to report dizziness and fall with head strike 4am. He  presented to a local ER where he received 2L IV hydration, antiemetics and pain medications. He started Lomotil on Saturday which initially helped but symptoms returned yesterday. Zeloda has been on hold since 8/15.    At urgent care earlier was found to have MICHAEL, gastroenteritis likely medication induced, FTT 2/2 nausea/vomiting and prolong QTc. Patient was +orthostatic on exam, s/p IV zofran and IV famotidine, Na 129 K 3.5 Mag 1.7, BUN/Cr 38/1.7  and s/p 1L IVF. Advised to come to [facility] for further management, IVF, cardiac monitoring, cardiac/GI evaluation. (19 Aug 2024 18:14)    RRT was called for hypoxia/respiratory distress to 80s. Pt was on BiPaP, exam significant for gargling sounds and distended abdomen. BiPaP was dc-ed immediately. Patient's lab during RRT showed metabolic acidosis to pH of 7.05 and PCO2 of 29, total CO2 9, HCO3 of 8. CXR showing lucency over the left hemidiaphragm is likely related to overlying bowel. No concern for flash. Patient was initially accepted to MICU for further management.     Code blue was called while getting CT. Pt vomited multiple times and intubation was successful after suctioning. CPR performed for ** rounds, epi x *** given. ROSC after ** mins.    Patient stabilized and transferred to MICU s/p cardiac arrest.       PAST MEDICAL & SURGICAL HISTORY:  Rectal cancer  HTN (hypertension)  High cholesterol    No significant past surgical history    FAMILY HISTORY:    SOCIAL HISTORY:  Substance Use: no    MEDICATIONS  (STANDING):  albumin human 25% IVPB 50 milliLiter(s) IV Intermittent once  albuterol/ipratropium for Nebulization. 3 milliLiter(s) Nebulizer once  aspirin enteric coated 81 milliGRAM(s) Oral daily  chlorhexidine 2% Cloths 1 Application(s) Topical daily  diphenoxylate/atropine 2 Tablet(s) Oral every 6 hours  finasteride 5 milliGRAM(s) Oral daily  FIRST- Mouthwash  BLM 10 milliLiter(s) Swish and Spit five times a day  furosemide   Injectable 20 milliGRAM(s) IV Push once  norepinephrine Infusion 0.05 MICROgram(s)/kG/Min (6.59 mL/Hr) IV Continuous <Continuous>  pantoprazole  Injectable 40 milliGRAM(s) IV Push daily  sodium bicarbonate 1300 milliGRAM(s) Oral two times a day  sodium bicarbonate  Infusion 0.133 mEq/kG/Hr (125 mL/Hr) IV Continuous <Continuous>  sucralfate suspension 1 Gram(s) Oral every 6 hours    MEDICATIONS  (PRN):  aluminum hydroxide/magnesium hydroxide/simethicone Suspension 30 milliLiter(s) Oral every 4 hours PRN Dyspepsia  chlorproMAZINE    IVPB 25 milliGRAM(s) IV Intermittent every 8 hours PRN hiccups  ondansetron Injectable 4 milliGRAM(s) IV Push every 6 hours PRN Nausea and/or Vomiting  oxyCODONE    IR 5 milliGRAM(s) Oral every 8 hours PRN Moderate Pain (4 - 6)      Allergies  No Known Allergies  Intolerances    REVIEW OF SYSTEMS:  [x] Unable to assess ROS because sedation    OBJECTIVE:  ICU Vital Signs Last 24 Hrs  T(C): 36.4 (23 Aug 2024 17:24), Max: 36.9 (23 Aug 2024 12:53)  T(F): 97.6 (23 Aug 2024 17:24), Max: 98.4 (23 Aug 2024 12:53)  HR: 115 (23 Aug 2024 17:24) (87 - 120)  BP: 100/62 (23 Aug 2024 18:00) (85/60 - 128/74)  BP(mean): --  ABP: --  ABP(mean): --  RR: 18 (23 Aug 2024 17:24) (17 - 20)  SpO2: 95% (23 Aug 2024 17:24) (92% - 95%)    O2 Parameters below as of 23 Aug 2024 17:24  Patient On (Oxygen Delivery Method): room air              08-22 @ 07:01  -  08-23 @ 07:00  --------------------------------------------------------  IN: 1050 mL / OUT: 0 mL / NET: 1050 mL      CAPILLARY BLOOD GLUCOSE      POCT Blood Glucose.: 154 mg/dL (23 Aug 2024 20:45)      PHYSICAL EXAM: Limited  GENERAL: sedated/intubated  HEAD:  Atraumatic, normocephalic  EYES: Limited due to sedation  ENT: Moist mucous membranes  NECK: Supple, no JVD  HEART: S1, S2, regular rate and rhythm, no murmurs, rubs, or gallops  LUNGS: +gargling sounds b/l on first RRT  ABDOMEN: Nontender, +distended, tympanic sound on percussion.   EXTREMITIES: 2+ peripheral pulses bilaterally. No clubbing, cyanosis, or edema  NERVOUS SYSTEM:  Sedated/intubated  SKIN: No rashes or lesions    LABS:                        15.0   7.74  )-----------( 432      ( 23 Aug 2024 07:47 )             49.2     Hgb Trend: 15.0<--, 14.9<--, 12.8<--, 14.0<--  08-23    128<L>  |  95<L>  |  38<H>  ----------------------------<  159<H>  4.7   |  11<L>  |  1.73<H>    Ca    7.7<L>      23 Aug 2024 07:47    TPro  5.6<L>  /  Alb  2.5<L>  /  TBili  1.6<H>  /  DBili  x   /  AST  36  /  ALT  37  /  AlkPhos  91  08-23    Creatinine Trend: 1.73<--, 1.21<--, 1.20<--, 1.16<--, 1.65<--, 1.77<--    Urinalysis Basic - ( 23 Aug 2024 07:47 )    Color: x / Appearance: x / SG: x / pH: x  Gluc: 159 mg/dL / Ketone: x  / Bili: x / Urobili: x   Blood: x / Protein: x / Nitrite: x   Leuk Esterase: x / RBC: x / WBC x   Sq Epi: x / Non Sq Epi: x / Bacteria: x        Venous Blood Gas:  08-23 @ 21:33  7.05/29/245/8/95.4  VBG Lactate: --      MICROBIOLOGY:     RADIOLOGY & ADDITIONAL TESTS:    ASSESSMENT  VIRAJ FOY is a 64y male with PMH of HTN, BPH, upper rectal adenocarcinoma, under care of Dr. Marcello Caballero at [facility] [place], started treatment with oxaliplatin on 8/2/2024 + capecitabine. He received Venofer x 3, last on 8/16, in the hospital for 4d transferred to the MICU s/p cardiac arrest.     PLAN  =====Neurologic=====  #Sedation    =====Pulmonary=====  #Hypoxia  - Pt currently intubated after cardiac arrest   - CXR showing lucency over the left hemidiaphragm is likely related to overlying bowel. No concerns for flash      =====Cardiovascular=====  #Cardiac arrest  - EKG  -ProBNP, trop  - POCUS  CXR       =====GI=====  #Rectal CA  - Follow oncology   - Currently held chemo while in the hospital    #Distended bowel  #Colitis  - Protonix 40mg IV QD    #Diet  - NPO except medication    =====Renal/=====  #Metabolic acidosis with respiratory compensation  - vBG during rapid showing pH 7.05, PCO2 29, pO2 245, HCO3 8, total CO2 9.  - Bicarb 11, AG of 22 at 7:47 earlier today, acute change from prior lab, may be 2/2 hidden cause such as lactate. Will repeat labs  - Septic etiology from intraabdominal process leading to aspiration  - bicarb drip 150cc/hr with goal of pH 7.2  - Maintenance fluid    #MICHAEL  - secondary to chemo - Oxaliplatin   - sCr 1.73, BUN 38, GFR 43.    #Electrolytes  - Maintain K>4, Phos>3, Mag>2, iCal>1    #BPH  - C/w Finestride    =====Endocrine=====  #DM2  - While NPO, FSBG and AUDREY q6h    =====Infectious Disease=====  - High chance of aspiration   - Start abx for empiric coverage.  - Infectious workup: blood culture x2, sputum culture, urine culture, and UA     =====Heme/Onc=====  # Rectal CA  - F/u onc   - PT, PTT, INR  - Maintain active type and screen    #DVT Ppx  - Heparin SQ q8  - SCD    =====Ethics=====  FULL CODE MICU ACCEPT NOTE    CHIEF COMPLAINT: Patient is a 64y old  Male who presents with a chief complaint of MICHAEL.     HPI:  65 y/o male with HTN, BPH, upper rectal adenocarcinoma, under care of Dr. Marcello Caballero at [facility] [place], started treatment with oxaliplatin on 8/2/2024 + capecitabine. He received Venofer x 3, last on 8/16. He presented to [facility] [place] urgent [place] earlier due to worsening diarrhea (no blood reported), poor appetite and weight loss. He was last seen in the [facility] 8/14, where CT noted ileitis and colitis. He was given IVF, IV Pepcid, IV Zofran and IV Toradol and subsequently sent home. Stool studies were negative, but he was empirically treated with azithromycin 500mg x 3 days the following day. Last Friday he called to report dizziness and fall with head strike 4am. He  presented to a local ER where he received 2L IV hydration, antiemetics and pain medications. He started Lomotil on Saturday which initially helped but symptoms returned yesterday. Zeloda has been on hold since 8/15.    At urgent care earlier was found to have MICHAEL, gastroenteritis likely medication induced, FTT 2/2 nausea/vomiting and prolong QTc. Patient was +orthostatic on exam, s/p IV zofran and IV famotidine, Na 129 K 3.5 Mag 1.7, BUN/Cr 38/1.7  and s/p 1L IVF. Advised to come to [facility] for further management, IVF, cardiac monitoring, cardiac/GI evaluation. (19 Aug 2024 18:14)    RRT was called for hypoxia/respiratory distress to 80s. Pt was on BiPaP, exam significant for gargling sounds and distended abdomen. BiPaP was dc-ed immediately. Patient's lab during RRT showed metabolic acidosis to pH of 7.05 and PCO2 of 29, total CO2 9, HCO3 of 8. CXR showing lucency over the left hemidiaphragm is likely related to overlying bowel. No concern for flash. Patient was initially accepted to MICU for further management.     Code blue was called while getting CT. Pt vomited multiple times and intubation was successful after suctioning. CPR performed for ** rounds, epi x *** given. ROSC after ** mins.    Patient stabilized and transferred to MICU s/p cardiac arrest.       PAST MEDICAL & SURGICAL HISTORY:  Rectal cancer  HTN (hypertension)  High cholesterol    No significant past surgical history    FAMILY HISTORY:    SOCIAL HISTORY:  Substance Use: no    MEDICATIONS  (STANDING):  albumin human 25% IVPB 50 milliLiter(s) IV Intermittent once  albuterol/ipratropium for Nebulization. 3 milliLiter(s) Nebulizer once  aspirin enteric coated 81 milliGRAM(s) Oral daily  chlorhexidine 2% Cloths 1 Application(s) Topical daily  diphenoxylate/atropine 2 Tablet(s) Oral every 6 hours  finasteride 5 milliGRAM(s) Oral daily  FIRST- Mouthwash  BLM 10 milliLiter(s) Swish and Spit five times a day  furosemide   Injectable 20 milliGRAM(s) IV Push once  norepinephrine Infusion 0.05 MICROgram(s)/kG/Min (6.59 mL/Hr) IV Continuous <Continuous>  pantoprazole  Injectable 40 milliGRAM(s) IV Push daily  sodium bicarbonate 1300 milliGRAM(s) Oral two times a day  sodium bicarbonate  Infusion 0.133 mEq/kG/Hr (125 mL/Hr) IV Continuous <Continuous>  sucralfate suspension 1 Gram(s) Oral every 6 hours    MEDICATIONS  (PRN):  aluminum hydroxide/magnesium hydroxide/simethicone Suspension 30 milliLiter(s) Oral every 4 hours PRN Dyspepsia  chlorproMAZINE    IVPB 25 milliGRAM(s) IV Intermittent every 8 hours PRN hiccups  ondansetron Injectable 4 milliGRAM(s) IV Push every 6 hours PRN Nausea and/or Vomiting  oxyCODONE    IR 5 milliGRAM(s) Oral every 8 hours PRN Moderate Pain (4 - 6)      Allergies  No Known Allergies  Intolerances    REVIEW OF SYSTEMS:  [x] Unable to assess ROS because sedation    OBJECTIVE:  ICU Vital Signs Last 24 Hrs  T(C): 36.4 (23 Aug 2024 17:24), Max: 36.9 (23 Aug 2024 12:53)  T(F): 97.6 (23 Aug 2024 17:24), Max: 98.4 (23 Aug 2024 12:53)  HR: 115 (23 Aug 2024 17:24) (87 - 120)  BP: 100/62 (23 Aug 2024 18:00) (85/60 - 128/74)  BP(mean): --  ABP: --  ABP(mean): --  RR: 18 (23 Aug 2024 17:24) (17 - 20)  SpO2: 95% (23 Aug 2024 17:24) (92% - 95%)    O2 Parameters below as of 23 Aug 2024 17:24  Patient On (Oxygen Delivery Method): room air              08-22 @ 07:01  -  08-23 @ 07:00  --------------------------------------------------------  IN: 1050 mL / OUT: 0 mL / NET: 1050 mL      CAPILLARY BLOOD GLUCOSE      POCT Blood Glucose.: 154 mg/dL (23 Aug 2024 20:45)      PHYSICAL EXAM: Limited  GENERAL: sedated/intubated  HEAD:  Atraumatic, normocephalic  EYES: Limited due to sedation  ENT: Moist mucous membranes  NECK: Supple, no JVD  HEART: S1, S2, regular rate and rhythm, no murmurs, rubs, or gallops  LUNGS: +gargling sounds b/l on first RRT  ABDOMEN: Nontender, +distended, tympanic sound on percussion.   EXTREMITIES: 2+ peripheral pulses bilaterally. No clubbing, cyanosis, or edema  NERVOUS SYSTEM:  Sedated/intubated  SKIN: No rashes or lesions    LABS:                        15.0   7.74  )-----------( 432      ( 23 Aug 2024 07:47 )             49.2     Hgb Trend: 15.0<--, 14.9<--, 12.8<--, 14.0<--  08-23    128<L>  |  95<L>  |  38<H>  ----------------------------<  159<H>  4.7   |  11<L>  |  1.73<H>    Ca    7.7<L>      23 Aug 2024 07:47    TPro  5.6<L>  /  Alb  2.5<L>  /  TBili  1.6<H>  /  DBili  x   /  AST  36  /  ALT  37  /  AlkPhos  91  08-23    Creatinine Trend: 1.73<--, 1.21<--, 1.20<--, 1.16<--, 1.65<--, 1.77<--    Urinalysis Basic - ( 23 Aug 2024 07:47 )    Color: x / Appearance: x / SG: x / pH: x  Gluc: 159 mg/dL / Ketone: x  / Bili: x / Urobili: x   Blood: x / Protein: x / Nitrite: x   Leuk Esterase: x / RBC: x / WBC x   Sq Epi: x / Non Sq Epi: x / Bacteria: x        Venous Blood Gas:  08-23 @ 21:33  7.05/29/245/8/95.4  VBG Lactate: --      MICROBIOLOGY:     RADIOLOGY & ADDITIONAL TESTS:    ASSESSMENT  VIRAJ FOY is a 64y male with PMH of HTN, BPH, upper rectal adenocarcinoma, under care of Dr. Marcello Caballero at [facility] [place], started treatment with oxaliplatin on 8/2/2024 + capecitabine. He received Venofer x 3, last on 8/16, in the hospital for 4d transferred to the MICU s/p cardiac arrest.     PLAN  =====Neurologic=====  #Sedation    =====Pulmonary=====  #Hypoxia  - Pt currently intubated after cardiac arrest   - CXR showing lucency over the left hemidiaphragm is likely related to overlying bowel. No concerns for flash      =====Cardiovascular=====  #Cardiac arrest  - EKG  -ProBNP 628, trop  - POCUS  CXR       =====GI=====  #Rectal CA  - Follow oncology   - Currently held chemo while in the hospital    #Gastric obstruction    #Colitis  - Protonix 40mg IV QD    #Diet  - NPO except medication    =====Renal/=====  #Metabolic acidosis  - Post Code blue: aVG- pH 6.93, lactate 9.7  - vBG during rapid showing pH 7.05, PCO2 29, pO2 245, HCO3 8, total CO2 9.  - AG of 22, acute change from a day prior of 18.  - Etiology includes intraabdominal process  - Bicarb drip 150cc/hr with goal of pH 7.2  - Maintenance fluid    #MICHAEL  - Likely caused by chemo, on Oxaliplatin, worsened by cardiac arrest  - sCr 1.73 -->2.59, BUN 38 00>51, GFR 43 -->26.  - Maintenance fluid    #Electrolytes  - Maintain K>4, Phos>3, Mag>2, iCal>1    #BPH  - C/w Finestride    =====Endocrine=====  #Metabolic acidosis  - see above    =====Infectious Disease=====  - Pt vomited multiple times during code blue. High chance of aspiration.   - Zosyn for empiric coverage  - Infectious workup: blood culture x2, sputum culture, urine culture, and UA     =====Heme/Onc=====  #Rectal CA  - Follow oncology rec  - Currently held chemo while in the hospital  - PT, PTT, INR  - Maintain active type and screen    #DVT Ppx  - Heparin SQ q8  - SCD    =====Ethics=====  FULL CODE MICU ACCEPT NOTE    CHIEF COMPLAINT: Patient is a 64y old  Male who presents with a chief complaint of MICHAEL.     HPI:  65 y/o male with HTN, BPH, upper rectal adenocarcinoma, under care of Dr. Marcello Caballero at [facility] [place], started treatment with oxaliplatin on 8/2/2024 + capecitabine. He received Venofer x 3, last on 8/16. He presented to [facility] urgent care earlier due to worsening diarrhea (no blood reported), poor appetite and weight loss. He was last seen in the [facility] 8/14, where CT noted ileitis and colitis. He was given IVF, IV Pepcid, IV Zofran and IV Toradol and subsequently sent home. Stool studies were negative, but he was empirically treated with azithromycin 500mg x 3 days the following day. Last Friday he called to report dizziness and fall with head strike 4am. He  presented to a local ER where he received 2L IV hydration, antiemetics and pain medications. He started Lomotil on Saturday which initially helped but symptoms returned yesterday. Zeloda has been on hold since 8/15.    At urgent care earlier was found to have MICHAEL, gastroenteritis likely medication induced, FTT 2/2 nausea/vomiting and prolong QTc. Patient was +orthostatic on exam, s/p IV zofran and IV famotidine, Na 129 K 3.5 Mag 1.7, BUN/Cr 38/1.7  and s/p 1L IVF. Advised to come to [facility] for further management, IVF, cardiac monitoring, cardiac/GI evaluation. (19 Aug 2024 18:14)    RRT was called for hypoxia/respiratory distress to 80s. Pt was on BiPaP, exam significant for gargling sounds and distended abdomen. BiPaP was dc-ed immediately. Patient's lab during RRT showed metabolic acidosis to pH of 7.05 and PCO2 of 29, total CO2 9, HCO3 of 8. CXR showing lucency over the left hemidiaphragm is likely related to overlying bowel. No concern for flash. Patient was initially accepted to MICU for further management.     Code blue was called while getting CT. Pt vomited multiple times and intubation was successful after suctioning. CPR performed for ** rounds, epi x *** given. ROSC after ** mins.    Patient stabilized and transferred to MICU s/p cardiac arrest.       PAST MEDICAL & SURGICAL HISTORY:  Rectal cancer  HTN (hypertension)  High cholesterol    No significant past surgical history    FAMILY HISTORY:    SOCIAL HISTORY:  Substance Use: no    MEDICATIONS  (STANDING):  albumin human 25% IVPB 50 milliLiter(s) IV Intermittent once  albuterol/ipratropium for Nebulization. 3 milliLiter(s) Nebulizer once  aspirin enteric coated 81 milliGRAM(s) Oral daily  chlorhexidine 2% Cloths 1 Application(s) Topical daily  diphenoxylate/atropine 2 Tablet(s) Oral every 6 hours  finasteride 5 milliGRAM(s) Oral daily  FIRST- Mouthwash  BLM 10 milliLiter(s) Swish and Spit five times a day  furosemide   Injectable 20 milliGRAM(s) IV Push once  norepinephrine Infusion 0.05 MICROgram(s)/kG/Min (6.59 mL/Hr) IV Continuous <Continuous>  pantoprazole  Injectable 40 milliGRAM(s) IV Push daily  sodium bicarbonate 1300 milliGRAM(s) Oral two times a day  sodium bicarbonate  Infusion 0.133 mEq/kG/Hr (125 mL/Hr) IV Continuous <Continuous>  sucralfate suspension 1 Gram(s) Oral every 6 hours    MEDICATIONS  (PRN):  aluminum hydroxide/magnesium hydroxide/simethicone Suspension 30 milliLiter(s) Oral every 4 hours PRN Dyspepsia  chlorproMAZINE    IVPB 25 milliGRAM(s) IV Intermittent every 8 hours PRN hiccups  ondansetron Injectable 4 milliGRAM(s) IV Push every 6 hours PRN Nausea and/or Vomiting  oxyCODONE    IR 5 milliGRAM(s) Oral every 8 hours PRN Moderate Pain (4 - 6)      Allergies  No Known Allergies  Intolerances    REVIEW OF SYSTEMS:  [x] Unable to assess ROS because sedation    OBJECTIVE:  ICU Vital Signs Last 24 Hrs  T(C): 36.4 (23 Aug 2024 17:24), Max: 36.9 (23 Aug 2024 12:53)  T(F): 97.6 (23 Aug 2024 17:24), Max: 98.4 (23 Aug 2024 12:53)  HR: 115 (23 Aug 2024 17:24) (87 - 120)  BP: 100/62 (23 Aug 2024 18:00) (85/60 - 128/74)  BP(mean): --  ABP: --  ABP(mean): --  RR: 18 (23 Aug 2024 17:24) (17 - 20)  SpO2: 95% (23 Aug 2024 17:24) (92% - 95%)    O2 Parameters below as of 23 Aug 2024 17:24  Patient On (Oxygen Delivery Method): room air              08-22 @ 07:01  -  08-23 @ 07:00  --------------------------------------------------------  IN: 1050 mL / OUT: 0 mL / NET: 1050 mL      CAPILLARY BLOOD GLUCOSE      POCT Blood Glucose.: 154 mg/dL (23 Aug 2024 20:45)      PHYSICAL EXAM: Limited  GENERAL: sedated/intubated  HEAD:  Atraumatic, normocephalic  EYES: Limited due to sedation  ENT: Moist mucous membranes  NECK: Supple, no JVD  HEART: S1, S2, regular rate and rhythm, no murmurs, rubs, or gallops  LUNGS: +gargling sounds b/l on first RRT  ABDOMEN: Nontender, +distended, tympanic sound on percussion.   EXTREMITIES: 2+ peripheral pulses bilaterally. No clubbing, cyanosis, or edema  NERVOUS SYSTEM:  Sedated/intubated  SKIN: No rashes or lesions    LABS:                        15.0   7.74  )-----------( 432      ( 23 Aug 2024 07:47 )             49.2     Hgb Trend: 15.0<--, 14.9<--, 12.8<--, 14.0<--  08-23    128<L>  |  95<L>  |  38<H>  ----------------------------<  159<H>  4.7   |  11<L>  |  1.73<H>    Ca    7.7<L>      23 Aug 2024 07:47    TPro  5.6<L>  /  Alb  2.5<L>  /  TBili  1.6<H>  /  DBili  x   /  AST  36  /  ALT  37  /  AlkPhos  91  08-23    Creatinine Trend: 1.73<--, 1.21<--, 1.20<--, 1.16<--, 1.65<--, 1.77<--    Urinalysis Basic - ( 23 Aug 2024 07:47 )    Color: x / Appearance: x / SG: x / pH: x  Gluc: 159 mg/dL / Ketone: x  / Bili: x / Urobili: x   Blood: x / Protein: x / Nitrite: x   Leuk Esterase: x / RBC: x / WBC x   Sq Epi: x / Non Sq Epi: x / Bacteria: x        Venous Blood Gas:  08-23 @ 21:33  7.05/29/245/8/95.4  VBG Lactate: --      MICROBIOLOGY:     RADIOLOGY & ADDITIONAL TESTS:    ASSESSMENT  VIRAJ FOY is a 64y male with PMH of HTN, BPH, upper rectal adenocarcinoma, under care of Dr. Marcello Caballero at [facility] [place], started treatment with oxaliplatin on 8/2/2024 + capecitabine. He received Venofer x 3, last on 8/16, in the hospital for 4d transferred to the MICU s/p cardiac arrest.     PLAN  =====Neurologic=====  #Sedation  - Ativan 4, Fentanyl drip, Precedex    =====Pulmonary=====  #Hypoxia  - s/p cardiac arrest - Intubated, mechanical vent - pressure control, PIP 15, peep 5, Fio2 40%  - Likely aspirated during code blue  - Repeat CXR  - Initial CXR during first RRT showing lucency over the left hemidiaphragm is likely related to overlying bowel. No concerns for flash.      =====Cardiovascular=====  #Cardiac arrest  - EKG  -ProBNP 628, trop  - POCUS  CXR       =====GI=====  #Rectal CA  - Follow oncology   - Currently held chemo while in the hospital    #Gastric obstruction    #Colitis  - Protonix 40mg IV QD    #Diet  - NPO except medication    =====Renal/=====  #Metabolic acidosis  - Post Code blue: aVG- pH 6.93, lactate 9.7  - vBG during rapid showing pH 7.05, PCO2 29, pO2 245, HCO3 8, total CO2 9.  - AG of 22, acute change from a day prior of 18.  - Etiology includes intraabdominal process  - Bicarb drip 150cc/hr with goal of pH 7.2  - Maintenance fluid    #MICHAEL  - Likely caused by chemo, on Oxaliplatin, worsened by cardiac arrest  - sCr 1.73 -->2.59, BUN 38 00>51, GFR 43 -->26.  - Maintenance fluid    #Electrolytes  - Maintain K>4, Phos>3, Mag>2, iCal>1    #BPH  - C/w Finestride    =====Endocrine=====  #Metabolic acidosis  - see above    =====Infectious Disease=====  - Pt vomited multiple times during code blue. High chance of aspiration.   - Zosyn for empiric coverage  - Infectious workup: blood culture x2, sputum culture, urine culture, and UA     =====Heme/Onc=====  #Rectal CA  - Follow oncology rec  - Currently held chemo while in the hospital  - PT, PTT, INR  - Maintain active type and screen    #DVT Ppx  - Heparin SQ q8  - SCD    =====Ethics=====  FULL CODE MICU ACCEPT NOTE    CHIEF COMPLAINT: Patient is a 64y old  Male who presents with a chief complaint of MICHAEL.     HPI:  63 y/o male with HTN, BPH, upper rectal adenocarcinoma, under care of Dr. Marcello Caballero at [facility] [place], started treatment with oxaliplatin on 8/2/2024 + capecitabine. He received Venofer x 3, last on 8/16. He presented to [facility] urgent care earlier due to worsening diarrhea (no blood reported), poor appetite and weight loss. He was last seen in the [facility] 8/14, where CT noted ileitis and colitis. He was given IVF, IV Pepcid, IV Zofran and IV Toradol and subsequently sent home. Stool studies were negative, but he was empirically treated with azithromycin 500mg x 3 days the following day. Last Friday he called to report dizziness and fall with head strike 4am. He  presented to a local ER where he received 2L IV hydration, antiemetics and pain medications. He started Lomotil on Saturday which initially helped but symptoms returned yesterday. Zeloda has been on hold since 8/15.    At urgent care earlier was found to have MICHAEL, gastroenteritis likely medication induced, FTT 2/2 nausea/vomiting and prolong QTc. Patient was +orthostatic on exam, s/p IV zofran and IV famotidine, Na 129 K 3.5 Mag 1.7, BUN/Cr 38/1.7  and s/p 1L IVF. Advised to come to [facility] for further management, IVF, cardiac monitoring, cardiac/GI evaluation. (19 Aug 2024 18:14)    RRT was called for hypoxia/respiratory distress to 80s. Pt was on BiPaP, exam significant for gargling sounds and distended abdomen. BiPaP was dc-ed immediately. Patient's lab during RRT showed metabolic acidosis to pH of 7.05 and PCO2 of 29, total CO2 9, HCO3 of 8. CXR showing lucency over the left hemidiaphragm is likely related to overlying bowel. No concern for flash. Patient was initially accepted to MICU for further management.     Code blue was called while getting CT. Pt vomited multiple times and intubation was successful after suctioning. CPR performed, achieved ROSC.   Patient stabilized and transferred to MICU s/p cardiac arrest.       PAST MEDICAL & SURGICAL HISTORY:  Rectal cancer  HTN (hypertension)  High cholesterol    No significant past surgical history    FAMILY HISTORY:    SOCIAL HISTORY:  Substance Use: no    MEDICATIONS  (STANDING):  albumin human 25% IVPB 50 milliLiter(s) IV Intermittent once  albuterol/ipratropium for Nebulization. 3 milliLiter(s) Nebulizer once  aspirin enteric coated 81 milliGRAM(s) Oral daily  chlorhexidine 2% Cloths 1 Application(s) Topical daily  diphenoxylate/atropine 2 Tablet(s) Oral every 6 hours  finasteride 5 milliGRAM(s) Oral daily  FIRST- Mouthwash  BLM 10 milliLiter(s) Swish and Spit five times a day  furosemide   Injectable 20 milliGRAM(s) IV Push once  norepinephrine Infusion 0.05 MICROgram(s)/kG/Min (6.59 mL/Hr) IV Continuous <Continuous>  pantoprazole  Injectable 40 milliGRAM(s) IV Push daily  sodium bicarbonate 1300 milliGRAM(s) Oral two times a day  sodium bicarbonate  Infusion 0.133 mEq/kG/Hr (125 mL/Hr) IV Continuous <Continuous>  sucralfate suspension 1 Gram(s) Oral every 6 hours    MEDICATIONS  (PRN):  aluminum hydroxide/magnesium hydroxide/simethicone Suspension 30 milliLiter(s) Oral every 4 hours PRN Dyspepsia  chlorproMAZINE    IVPB 25 milliGRAM(s) IV Intermittent every 8 hours PRN hiccups  ondansetron Injectable 4 milliGRAM(s) IV Push every 6 hours PRN Nausea and/or Vomiting  oxyCODONE    IR 5 milliGRAM(s) Oral every 8 hours PRN Moderate Pain (4 - 6)      Allergies  No Known Allergies  Intolerances    REVIEW OF SYSTEMS:  [x] Unable to assess ROS because sedation    OBJECTIVE:  ICU Vital Signs Last 24 Hrs  T(C): 36.4 (23 Aug 2024 17:24), Max: 36.9 (23 Aug 2024 12:53)  T(F): 97.6 (23 Aug 2024 17:24), Max: 98.4 (23 Aug 2024 12:53)  HR: 115 (23 Aug 2024 17:24) (87 - 120)  BP: 100/62 (23 Aug 2024 18:00) (85/60 - 128/74)  BP(mean): --  ABP: --  ABP(mean): --  RR: 18 (23 Aug 2024 17:24) (17 - 20)  SpO2: 95% (23 Aug 2024 17:24) (92% - 95%)    O2 Parameters below as of 23 Aug 2024 17:24  Patient On (Oxygen Delivery Method): room air    08-22 @ 07:01  -  08-23 @ 07:00  --------------------------------------------------------  IN: 1050 mL / OUT: 0 mL / NET: 1050 mL      CAPILLARY BLOOD GLUCOSE      POCT Blood Glucose.: 154 mg/dL (23 Aug 2024 20:45)      PHYSICAL EXAM: Limited  GENERAL: sedated/intubated  HEAD:  Atraumatic, normocephalic  EYES: Limited due to sedation  ENT: Moist mucous membranes  NECK: Supple, no JVD  HEART: S1, S2, regular rate and rhythm, no murmurs, rubs, or gallops  LUNGS: +gargling sounds b/l on first RRT  ABDOMEN: Nontender, +distended, tympanic sound on percussion.   EXTREMITIES: 2+ peripheral pulses bilaterally. No clubbing, cyanosis, or edema  NERVOUS SYSTEM:  Sedated/intubated  SKIN: No rashes or lesions    LABS:                        15.0   7.74  )-----------( 432      ( 23 Aug 2024 07:47 )             49.2     Hgb Trend: 15.0<--, 14.9<--, 12.8<--, 14.0<--  08-23    128<L>  |  95<L>  |  38<H>  ----------------------------<  159<H>  4.7   |  11<L>  |  1.73<H>    Ca    7.7<L>      23 Aug 2024 07:47    TPro  5.6<L>  /  Alb  2.5<L>  /  TBili  1.6<H>  /  DBili  x   /  AST  36  /  ALT  37  /  AlkPhos  91  08-23    Creatinine Trend: 1.73<--, 1.21<--, 1.20<--, 1.16<--, 1.65<--, 1.77<--    Urinalysis Basic - ( 23 Aug 2024 07:47 )    Color: x / Appearance: x / SG: x / pH: x  Gluc: 159 mg/dL / Ketone: x  / Bili: x / Urobili: x   Blood: x / Protein: x / Nitrite: x   Leuk Esterase: x / RBC: x / WBC x   Sq Epi: x / Non Sq Epi: x / Bacteria: x        Venous Blood Gas:  08-23 @ 21:33  7.05/29/245/8/95.4  VBG Lactate: --      MICROBIOLOGY:     RADIOLOGY & ADDITIONAL TESTS:    ASSESSMENT  VIRAJ FOY is a 64y male with PMH of HTN, BPH, upper rectal adenocarcinoma, under care of Dr. Marcello Caballero at [facility] [place], started treatment with oxaliplatin on 8/2/2024 + capecitabine. He received Venofer x 3, last on 8/16, in the hospital for 4d transferred to the MICU s/p cardiac arrest.     PLAN  =====Neurologic=====  - baseline a&O x4  - currently intubated and sedated     =====Pulmonary=====  #AHRF  #Aspiration   - s/p cardiac arrest - Intubated, mechanical vent - pressure control, PIP 15, peep 5, Fio2 40%  - suspect 2/2 aspiration episode     =====Cardiovascular=====  #Cardiac arrest  #Shock   - EKG  - f/u proBNP, trop   - POCUS    =====GI=====  #Rectal CA  - Follow oncology   - Currently held chemo while in the hospital    #Distended stomach   - f/u CT A/P   - OG tube to suction     - Protonix 40mg IV QD  - NPO except medication    =====Renal/=====  #Severe metabolic acidosis  #Lactic acidosis   - Post Code blue: aVG- pH 6.93, lactate 9.7  - vBG during rapid showing pH 7.05, PCO2 29, pO2 245, HCO3 8, total CO2 9.  - Bicarb drip 150cc/hr with goal of pH 7.2  - Maintenance fluid    #MICHAEL  - Likely caused by chemo, on Oxaliplatin, worsened by cardiac arrest in setting of shock   - monitor UO     #Electrolytes  - Maintain K>4, Phos>3, Mag>2, iCal>1    #BPH  - C/w Finestride    =====Endocrine=====  - no active issues     =====Infectious Disease=====  #Aspiration   - Zosyn for empiric coverage  - Infectious workup: blood culture x2, sputum culture, urine culture, and UA     =====Heme/Onc=====  #Rectal CA  - Follow oncology rec  - Currently held chemo while in the hospital  - PT, PTT, INR  - Maintain active type and screen    #DVT Ppx  - Heparin SQ q8  - SCD    =====Ethics=====  FULL CODE MICU ACCEPT NOTE    CHIEF COMPLAINT: Patient is a 64y old  Male who presents with a chief complaint of MICHAEL.     HPI:  65 y/o male with HTN, BPH, upper rectal adenocarcinoma, under care of Dr. Marcello Caballero at [facility] [place], started treatment with oxaliplatin on 8/2/2024 + capecitabine. He received Venofer x 3, last on 8/16. He presented to [facility] urgent care earlier due to worsening diarrhea (no blood reported), poor appetite and weight loss. He was last seen in the [facility] 8/14, where CT noted ileitis and colitis. He was given IVF, IV Pepcid, IV Zofran and IV Toradol and subsequently sent home. Stool studies were negative, but he was empirically treated with azithromycin 500mg x 3 days the following day. Last Friday he called to report dizziness and fall with head strike 4am. He  presented to a local ER where he received 2L IV hydration, antiemetics and pain medications. He started Lomotil on Saturday which initially helped but symptoms returned yesterday. Zeloda has been on hold since 8/15.    At urgent care earlier was found to have MICHAEL, gastroenteritis likely medication induced, FTT 2/2 nausea/vomiting and prolong QTc. Patient was +orthostatic on exam, s/p IV zofran and IV famotidine, Na 129 K 3.5 Mag 1.7, BUN/Cr 38/1.7  and s/p 1L IVF. Advised to come to [facility] for further management, IVF, cardiac monitoring, cardiac/GI evaluation. (19 Aug 2024 18:14)    RRT was called for hypoxia/respiratory distress to 80s. Pt was on BiPaP, exam significant for gargling sounds and distended abdomen. BiPaP was dc-ed immediately. Patient's lab during RRT showed metabolic acidosis to pH of 7.05 and PCO2 of 29, total CO2 9, HCO3 of 8. CXR showing lucency over the left hemidiaphragm is likely related to overlying bowel. No concern for flash. Patient was initially accepted to MICU for further management.     Code blue was called while getting CT. Pt vomited multiple times and intubation was successful after suctioning. CPR performed, achieved ROSC.   Patient stabilized and transferred to MICU s/p cardiac arrest.       PAST MEDICAL & SURGICAL HISTORY:  Rectal cancer  HTN (hypertension)  High cholesterol    No significant past surgical history    FAMILY HISTORY:    SOCIAL HISTORY:  Substance Use: no    MEDICATIONS  (STANDING):  albumin human 25% IVPB 50 milliLiter(s) IV Intermittent once  albuterol/ipratropium for Nebulization. 3 milliLiter(s) Nebulizer once  aspirin enteric coated 81 milliGRAM(s) Oral daily  chlorhexidine 2% Cloths 1 Application(s) Topical daily  diphenoxylate/atropine 2 Tablet(s) Oral every 6 hours  finasteride 5 milliGRAM(s) Oral daily  FIRST- Mouthwash  BLM 10 milliLiter(s) Swish and Spit five times a day  furosemide   Injectable 20 milliGRAM(s) IV Push once  norepinephrine Infusion 0.05 MICROgram(s)/kG/Min (6.59 mL/Hr) IV Continuous <Continuous>  pantoprazole  Injectable 40 milliGRAM(s) IV Push daily  sodium bicarbonate 1300 milliGRAM(s) Oral two times a day  sodium bicarbonate  Infusion 0.133 mEq/kG/Hr (125 mL/Hr) IV Continuous <Continuous>  sucralfate suspension 1 Gram(s) Oral every 6 hours    MEDICATIONS  (PRN):  aluminum hydroxide/magnesium hydroxide/simethicone Suspension 30 milliLiter(s) Oral every 4 hours PRN Dyspepsia  chlorproMAZINE    IVPB 25 milliGRAM(s) IV Intermittent every 8 hours PRN hiccups  ondansetron Injectable 4 milliGRAM(s) IV Push every 6 hours PRN Nausea and/or Vomiting  oxyCODONE    IR 5 milliGRAM(s) Oral every 8 hours PRN Moderate Pain (4 - 6)      Allergies  No Known Allergies  Intolerances    REVIEW OF SYSTEMS:  [x] Unable to assess ROS because sedation    OBJECTIVE:  ICU Vital Signs Last 24 Hrs  T(C): 36.4 (23 Aug 2024 17:24), Max: 36.9 (23 Aug 2024 12:53)  T(F): 97.6 (23 Aug 2024 17:24), Max: 98.4 (23 Aug 2024 12:53)  HR: 115 (23 Aug 2024 17:24) (87 - 120)  BP: 100/62 (23 Aug 2024 18:00) (85/60 - 128/74)  BP(mean): --  ABP: --  ABP(mean): --  RR: 18 (23 Aug 2024 17:24) (17 - 20)  SpO2: 95% (23 Aug 2024 17:24) (92% - 95%)    O2 Parameters below as of 23 Aug 2024 17:24  Patient On (Oxygen Delivery Method): room air    08-22 @ 07:01  -  08-23 @ 07:00  --------------------------------------------------------  IN: 1050 mL / OUT: 0 mL / NET: 1050 mL      CAPILLARY BLOOD GLUCOSE      POCT Blood Glucose.: 154 mg/dL (23 Aug 2024 20:45)      PHYSICAL EXAM: Limited  GENERAL: sedated/intubated  HEAD:  Atraumatic, normocephalic  EYES: Limited due to sedation  ENT: Moist mucous membranes  NECK: Supple, no JVD  HEART: S1, S2, regular rate and rhythm, no murmurs, rubs, or gallops  LUNGS: +gargling sounds b/l on first RRT  ABDOMEN: Nontender, +distended, tympanic sound on percussion.   EXTREMITIES: 2+ peripheral pulses bilaterally. No clubbing, cyanosis, or edema  NERVOUS SYSTEM:  Sedated/intubated  SKIN: No rashes or lesions    LABS:                        15.0   7.74  )-----------( 432      ( 23 Aug 2024 07:47 )             49.2     Hgb Trend: 15.0<--, 14.9<--, 12.8<--, 14.0<--  08-23    128<L>  |  95<L>  |  38<H>  ----------------------------<  159<H>  4.7   |  11<L>  |  1.73<H>    Ca    7.7<L>      23 Aug 2024 07:47    TPro  5.6<L>  /  Alb  2.5<L>  /  TBili  1.6<H>  /  DBili  x   /  AST  36  /  ALT  37  /  AlkPhos  91  08-23    Creatinine Trend: 1.73<--, 1.21<--, 1.20<--, 1.16<--, 1.65<--, 1.77<--    Urinalysis Basic - ( 23 Aug 2024 07:47 )    Color: x / Appearance: x / SG: x / pH: x  Gluc: 159 mg/dL / Ketone: x  / Bili: x / Urobili: x   Blood: x / Protein: x / Nitrite: x   Leuk Esterase: x / RBC: x / WBC x   Sq Epi: x / Non Sq Epi: x / Bacteria: x        Venous Blood Gas:  08-23 @ 21:33  7.05/29/245/8/95.4  VBG Lactate: --      MICROBIOLOGY:     RADIOLOGY & ADDITIONAL TESTS:    ASSESSMENT  VIRAJ FOY is a 64y male with PMH of HTN, BPH, upper rectal adenocarcinoma, under care of Dr. Marcello Caballero at [facility] [place], started treatment with oxaliplatin on 8/2/2024 + capecitabine. He received Venofer x 3, last on 8/16, in the hospital for 4d transferred to the MICU s/p cardiac arrest.     PLAN  =====Neurologic=====  - baseline a&O x4  - currently intubated and sedated     =====Pulmonary=====  #AHRF  #Aspiration   - s/p cardiac arrest - Intubated, mechanical vent - pressure control, PIP 15, peep 5, Fio2 40%  - suspect 2/2 aspiration episode     =====Cardiovascular=====  #Cardiac arrest  #Shock   - EKG  - f/u proBNP, trop   - POCUS    =====GI=====  #Rectal CA  - Follow oncology   - Currently held chemo while in the hospital    #Distended stomach   - f/u CT A/P   - OG tube to suction     - Protonix 40mg IV QD  - NPO except medication    =====Renal/=====  #Severe metabolic acidosis  #Lactic acidosis   - Post Code blue: aVG- pH 6.93, lactate 9.7  - vBG during rapid showing pH 7.05, PCO2 29, pO2 245, HCO3 8, total CO2 9.  - Bicarb drip 150cc/hr with goal of pH 7.2  - Maintenance fluid    #MICHAEL  - Likely caused by chemo, on Oxaliplatin, worsened by cardiac arrest in setting of shock   - monitor UO     #Electrolytes  - Maintain K>4, Phos>3, Mag>2, iCal>1    #BPH  - C/w Finestride    =====Endocrine=====  - no active issues     =====Infectious Disease=====  #Aspiration   - Zosyn for empiric coverage  - Infectious workup: blood culture x2, sputum culture, urine culture, and UA     =====Heme/Onc=====  #Rectal CA  - Follow oncology rec  - Currently held chemo while in the hospital  - PT, PTT, INR  - Maintain active type and screen    #DVT Ppx  - Heparin SQ q8  - SCD    =====Ethics=====  FULL CODE       Critical Care Attending Attestation:   64M Hx 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 RRT called this evening for sudden SOB/WOB with SpO2 80s, gurgling sound and placed on BiPAP for presumed flushed APE but CXR showed large fundal gas shadow with air fluid level, HAG-MA vpH =7.08, HCO3 = 8 and BiPAP DC and sent down for CT Abdomen /Pelvis. He completed the scan but suddenly became obtunded with PEA and Code Blue called in Radiology Suit. Multiple attempted intubation for massive aspiration required suction and RASS achieved x 18 Min and transferred immediately to MICU.   - Post Cardiac Arrest on Vent Support without any sedation upon arrival with HR 170s, SBP 80-90s   - Shock on 2 Pressors titration with urgent IO placement in ICU for adding 3rd Pressor   - Bedside POCUS, CVP/A-Lines, Xiao and OGT placed for wall suction   - Synchronized Cardioversion x 2 for A.Fib RVR 180s with SBP 70-80s without success   - IV HCO3 along with IV Amiodarone Bolus achieved stable HR Control   - Vent Setting titrated and adjusted to Pressure Control for SpO2 100%   - NPO, OGT Lavage after CXCR confirmation   - Empiric ABx Coverage for Aspiration Pneumonitis pending panculture   - F/U CT Abdomen/Pelvis and serial CBC, CMP, PT/INR, vph, Procalcitonin   - ARF/MICHAEL starting IV [id number] titration   - DVT PPx - SCD   - GOC - Full Code - Wife informed and updated clinical status         Patient seen and examined with ICU Resident/Fellow at bedside after lab data, medical records and radiology reports reviewed. I have read and agreeable in general with resident's Documented Note, Assessment and Management Plan which reflected my opinions from bedside round and discussion.   Total Critical Care Time = 45 Min excluding teaching and procedure activity. MICU ACCEPT NOTE    CHIEF COMPLAINT: Patient is a 64y old  Male who presents with a chief complaint of MICHAEL.     HPI:  65 y/o male with HTN, BPH, upper rectal adenocarcinoma, [place]cell[place] [facility] [place] with oxaliplatin on 8/2/2024 + capecitabine. He received Venofer x 3, last on 8/16. He presented to [facility] [place] urgent care earlier due to worsening diarrhea (no blood reported), poor appetite and weight loss. He was last seen in the [facility] 8/14, where CT noted ileitis and colitis. He was given IVF, IV Pepcid, IV Zofran and IV Toradol and subsequently sent home. Stool studies were negative, but he was empirically treated with azithromycin 500mg x 3 days the following day. Last Friday he called to report dizziness and fall with head strike 4am. He  presented to a local ER where he received 2L IV hydration, antiemetics and pain medications. He started Lomotil on Saturday which initially helped but symptoms returned yesterday. Zeloda has been on hold since 8/15.    At urgent care earlier was found to have MICHAEL, gastroenteritis likely medication induced, FTT 2/2 nausea/vomiting and prolong QTc. Patient was +orthostatic on exam, s/p IV zofran and IV famotidine, Na 129 K 3.5 Mag 1.7, BUN/Cr 38/1.7  and s/p 1L IVF. Advised to come to [facility] for further management, IVF, cardiac monitoring, cardiac/GI evaluation. (19 Aug 2024 18:14)    RRT was called for hypoxia/respiratory distress to 80s. Pt was on BiPaP, exam significant for gurgling sounds and distended abdomen. BiPaP was dc-ed immediately. Patient's lab during RRT showed metabolic acidosis to pH of 7.05 and PCO2 of 29, total CO2 9, HCO3 of 8. CXR showing lucency over the left hemidiaphragm is likely related to overlying bowel. No concern for flash. Patient was initially accepted to MICU for further management.     Code blue was called while getting CT. Pt vomited multiple times and intubation was successful after suctioning. CPR performed, achieved ROSC.   Patient stabilized and transferred to MICU s/p cardiac arrest.       PAST MEDICAL & SURGICAL HISTORY:  Rectal cancer  HTN (hypertension)  High cholesterol    No significant past surgical history    FAMILY HISTORY:    SOCIAL HISTORY:  Substance Use: no    MEDICATIONS  (STANDING):  albumin human 25% IVPB 50 milliLiter(s) IV Intermittent once  albuterol/ipratropium for Nebulization. 3 milliLiter(s) Nebulizer once  aspirin enteric coated 81 milliGRAM(s) Oral daily  chlorhexidine 2% Cloths 1 Application(s) Topical daily  diphenoxylate/atropine 2 Tablet(s) Oral every 6 hours  finasteride 5 milliGRAM(s) Oral daily  FIRST- Mouthwash  BLM 10 milliLiter(s) Swish and Spit five times a day  furosemide   Injectable 20 milliGRAM(s) IV Push once  norepinephrine Infusion 0.05 MICROgram(s)/kG/Min (6.59 mL/Hr) IV Continuous <Continuous>  pantoprazole  Injectable 40 milliGRAM(s) IV Push daily  sodium bicarbonate 1300 milliGRAM(s) Oral two times a day  sodium bicarbonate  Infusion 0.133 mEq/kG/Hr (125 mL/Hr) IV Continuous <Continuous>  sucralfate suspension 1 Gram(s) Oral every 6 hours    MEDICATIONS  (PRN):  aluminum hydroxide/magnesium hydroxide/simethicone Suspension 30 milliLiter(s) Oral every 4 hours PRN Dyspepsia  chlorproMAZINE    IVPB 25 milliGRAM(s) IV Intermittent every 8 hours PRN hiccups  ondansetron Injectable 4 milliGRAM(s) IV Push every 6 hours PRN Nausea and/or Vomiting  oxyCODONE    IR 5 milliGRAM(s) Oral every 8 hours PRN Moderate Pain (4 - 6)      Allergies  No Known Allergies  Intolerances    REVIEW OF SYSTEMS:  [x] Unable to assess ROS because sedation    OBJECTIVE:  ICU Vital Signs Last 24 Hrs  T(C): 36.4 (23 Aug 2024 17:24), Max: 36.9 (23 Aug 2024 12:53)  T(F): 97.6 (23 Aug 2024 17:24), Max: 98.4 (23 Aug 2024 12:53)  HR: 115 (23 Aug 2024 17:24) (87 - 120)  BP: 100/62 (23 Aug 2024 18:00) (85/60 - 128/74)  BP(mean): --  ABP: --  ABP(mean): --  RR: 18 (23 Aug 2024 17:24) (17 - 20)  SpO2: 95% (23 Aug 2024 17:24) (92% - 95%)    O2 Parameters below as of 23 Aug 2024 17:24  Patient On (Oxygen Delivery Method): room air    08-22 @ 07:01  -  08-23 @ 07:00  --------------------------------------------------------  IN: 1050 mL / OUT: 0 mL / NET: 1050 mL      CAPILLARY BLOOD GLUCOSE      POCT Blood Glucose.: 154 mg/dL (23 Aug 2024 20:45)      PHYSICAL EXAM: Limited  GENERAL: sedated/intubated  HEAD:  Atraumatic, normocephalic  EYES: Limited due to sedation  ENT: Moist mucous membranes  NECK: Supple, no JVD  HEART: S1, S2, regular rate and rhythm, no murmurs, rubs, or gallops  LUNGS: +gurgling sounds b/l on first RRT  ABDOMEN: Nontender, +distended, tympanic sound on percussion.   EXTREMITIES: 2+ peripheral pulses bilaterally. No clubbing, cyanosis, or edema  NERVOUS SYSTEM:  Sedated/intubated  SKIN: No rashes or lesions    LABS:                        15.0   7.74  )-----------( 432      ( 23 Aug 2024 07:47 )             49.2     Hgb Trend: 15.0<--, 14.9<--, 12.8<--, 14.0<--  08-23    128<L>  |  95<L>  |  38<H>  ----------------------------<  159<H>  4.7   |  11<L>  |  1.73<H>    Ca    7.7<L>      23 Aug 2024 07:47    TPro  5.6<L>  /  Alb  2.5<L>  /  TBili  1.6<H>  /  DBili  x   /  AST  36  /  ALT  37  /  AlkPhos  91  08-23    Creatinine Trend: 1.73<--, 1.21<--, 1.20<--, 1.16<--, 1.65<--, 1.77<--    Urinalysis Basic - ( 23 Aug 2024 07:47 )    Color: x / Appearance: x / SG: x / pH: x  Gluc: 159 mg/dL / Ketone: x  / Bili: x / Urobili: x   Blood: x / Protein: x / Nitrite: x   Leuk Esterase: x / RBC: x / WBC x   Sq Epi: x / Non Sq Epi: x / Bacteria: x        Venous Blood Gas:  08-23 @ 21:33  7.05/29/245/8/95.4  VBG Lactate: --      MICROBIOLOGY:     RADIOLOGY & ADDITIONAL TESTS:    ASSESSMENT  VIRAJ FOY is a 64y male with PMH of HTN, BPH, upper rectal adenocarcinoma, [place] Marcello Caballero [place] [facility] [place] treatment with oxaliplatin on 8/2/2024 + capecitabine. He received Venofer x 3, last on 8/16, in the hospital for 4d transferred to the MICU s/p cardiac arrest.     PLAN  =====Neurologic=====  - baseline a&O x4  - currently intubated and sedated     =====Pulmonary=====  #AHRF  #Aspiration   - s/p cardiac arrest - Intubated, mechanical vent - pressure control, PIP 15, peep 5, Fio2 40%  - suspect 2/2 aspiration episode     =====Cardiovascular=====  #Cardiac arrest  #Shock   - EKG  - f/u proBNP, trop   - POCUS    =====GI=====  #Rectal CA  - Follow oncology   - Currently held chemo while in the hospital    #Distended stomach   - f/u CT A/P   - OG tube to suction     - Protonix 40mg IV QD  - NPO except medication    =====Renal/=====  #Severe metabolic acidosis  #Lactic acidosis   - Post Code blue: aVG- pH 6.93, lactate 9.7  - vBG during rapid showing pH 7.05, PCO2 29, pO2 245, HCO3 8, total CO2 9.  - Bicarb drip 150cc/hr with goal of pH 7.2  - Maintenance fluid    #MICHAEL  - Likely caused by chemo, on Oxaliplatin, worsened by cardiac arrest in setting of shock   - monitor UO     #Electrolytes  - Maintain K>4, Phos>3, Mag>2, iCal>1    #BPH  - C/w Finestride    =====Endocrine=====  - no active issues     =====Infectious Disease=====  #Aspiration   - Zosyn for empiric coverage  - Infectious workup: blood culture x2, sputum culture, urine culture, and UA     =====Heme/Onc=====  #Rectal CA  - Follow oncology rec  - Currently held chemo while in the hospital  - PT, PTT, INR  - Maintain active type and screen    #DVT Ppx  - Heparin SQ q8  - SCD    =====Ethics=====  FULL CODE       Critical Care Attending Attestation:   64M Hx Upper Rectal Adenocarcinoma metastatic to Thoracolumbar Spine [place] [facility] [place] 8/2 in Weight Loss, Poor PO Intake, progressive NB Watery Diarrhea admitted to [facility] for Colitis/Gastroenteritis 8/19. RRT called this evening for sudden SOB/WOB with SpO2 80s, gurgling sound and placed on BiPAP for presumed flushed APE but CXR showed large fundal gas shadow with air fluid level, HAG-MA vpH =7.08, HCO3 = 8 and BiPAP DC and sent down for CT Abdomen /Pelvis. He completed the scan but suddenly became obtunded with PEA and Code Blue called in Radiology Suit. Multiple attempted intubation for massive aspiration required suction and RASS achieved x 18 Min and transferred immediately to MICU.   - Post Cardiac Arrest on Vent Support without any sedation upon arrival with HR 170s, SBP 80-90s   - Shock on 2 Pressors titration with urgent IO placement in ICU for adding 3rd Pressor   - Bedside POCUS, CVP/A-Lines, Xiao and OGT placed for wall suction   - Synchronized Cardioversion x 2 for A.Fib RVR 180s with SBP 70-80s without success   - IV HCO3 along with IV Amiodarone Bolus achieved stable HR Control   - Vent Setting titrated and adjusted to Pressure Control for SpO2 100%   - NPO, OGT Lavage after CXCR confirmation   - Empiric ABx Coverage for Aspiration Pneumonitis pending panculture   - F/U CT Abdomen/Pelvis and serial CBC, CMP, PT/INR, vph, Procalcitonin   - ARF/MICHAEL starting IV [id number] titration   - DVT PPx - SCD   - GOC - Full Code - Wife informed and updated clinical status         Patient seen and examined with ICU Resident/Fellow at bedside after lab data, medical records and radiology reports reviewed. I have read and agreeable in general with resident's Documented Note, Assessment and Management Plan which reflected my opinions from bedside round and discussion.   Total Critical Care Time = 45 Min excluding teaching and procedure activity. MICU ACCEPT NOTE    CHIEF COMPLAINT: Patient is a 64y old  Male who presents with a chief complaint of MICHAEL.     HPI:  63 y/o male with HTN, BPH, upper rectal adenocarcinoma, under care of Dr. Marcello Caballero at [facility] [place], started treatment with oxaliplatin on 8/2/2024 + capecitabine. He received Venofer x 3, last on 8/16. He presented to [facility] [place] urgent care earlier due to worsening diarrhea (no blood reported), poor appetite and weight loss. He was last seen in the [facility] 8/14, where CT noted ileitis and colitis. He was given IVF, IV Pepcid, IV Zofran and IV Toradol and subsequently sent home. Stool studies were negative, but he was empirically treated with azithromycin 500mg x 3 days the following day. Last Friday he called to report dizziness and fall with head strike 4am. He  presented to a local ER where he received 2L IV hydration, antiemetics and pain medications. He started Lomotil on Saturday which initially helped but symptoms returned yesterday. Zeloda has been on hold since 8/15.    At urgent care earlier was found to have MICHAEL, gastroenteritis likely medication induced, FTT 2/2 nausea/vomiting and prolong QTc. Patient was +orthostatic on exam, s/p IV zofran and IV famotidine, Na 129 K 3.5 Mag 1.7, BUN/Cr 38/1.7  and s/p 1L IVF. Advised to come to [facility] for further management, IVF, cardiac monitoring, cardiac/GI evaluation. (19 Aug 2024 18:14)    RRT called 8/23 for hypoxia, loud gurgling and increased WOB which patient was placed on NIPPV for presumed flash pulm edema. CXR showed lucency over the left hemidiaphragm, likely related to overlying bowel. Given concern for new hypoxia, tachycardia, hypotension, low bicarb on AM lab and abdominal distension, patient was transported for CT C/A/P. Patient aspirated while laying flat for CT and code blue initiated, s/p epi x5, bicarb x 3 amps and 1g calcium chloride. ROSC after 18 mins. Patient accepted to MICU s/p cardiac arrest.     PAST MEDICAL & SURGICAL HISTORY:  Rectal cancer  HTN (hypertension)  High cholesterol    No significant past surgical history    FAMILY HISTORY:    SOCIAL HISTORY:  Substance Use: no    MEDICATIONS  (STANDING):  albumin human 25% IVPB 50 milliLiter(s) IV Intermittent once  albuterol/ipratropium for Nebulization. 3 milliLiter(s) Nebulizer once  aspirin enteric coated 81 milliGRAM(s) Oral daily  chlorhexidine 2% Cloths 1 Application(s) Topical daily  diphenoxylate/atropine 2 Tablet(s) Oral every 6 hours  finasteride 5 milliGRAM(s) Oral daily  FIRST- Mouthwash  BLM 10 milliLiter(s) Swish and Spit five times a day  furosemide   Injectable 20 milliGRAM(s) IV Push once  norepinephrine Infusion 0.05 MICROgram(s)/kG/Min (6.59 mL/Hr) IV Continuous <Continuous>  pantoprazole  Injectable 40 milliGRAM(s) IV Push daily  sodium bicarbonate 1300 milliGRAM(s) Oral two times a day  sodium bicarbonate  Infusion 0.133 mEq/kG/Hr (125 mL/Hr) IV Continuous <Continuous>  sucralfate suspension 1 Gram(s) Oral every 6 hours    MEDICATIONS  (PRN):  aluminum hydroxide/magnesium hydroxide/simethicone Suspension 30 milliLiter(s) Oral every 4 hours PRN Dyspepsia  chlorproMAZINE    IVPB 25 milliGRAM(s) IV Intermittent every 8 hours PRN hiccups  ondansetron Injectable 4 milliGRAM(s) IV Push every 6 hours PRN Nausea and/or Vomiting  oxyCODONE    IR 5 milliGRAM(s) Oral every 8 hours PRN Moderate Pain (4 - 6)    Allergies  No Known Allergies  Intolerances    REVIEW OF SYSTEMS:  [x] Unable to assess ROS because sedation    OBJECTIVE:  ICU Vital Signs Last 24 Hrs  T(C): 36.4 (23 Aug 2024 17:24), Max: 36.9 (23 Aug 2024 12:53)  T(F): 97.6 (23 Aug 2024 17:24), Max: 98.4 (23 Aug 2024 12:53)  HR: 115 (23 Aug 2024 17:24) (87 - 120)  BP: 100/62 (23 Aug 2024 18:00) (85/60 - 128/74)  BP(mean): --  ABP: --  ABP(mean): --  RR: 18 (23 Aug 2024 17:24) (17 - 20)  SpO2: 95% (23 Aug 2024 17:24) (92% - 95%)    O2 Parameters below as of 23 Aug 2024 17:24  Patient On (Oxygen Delivery Method): room air    08-22 @ 07:01  -  08-23 @ 07:00  --------------------------------------------------------  IN: 1050 mL / OUT: 0 mL / NET: 1050 mL    CAPILLARY BLOOD GLUCOSE  POCT Blood Glucose.: 154 mg/dL (23 Aug 2024 20:45)    PHYSICAL EXAM:  GENERAL: sedated/intubated  HEAD: Atraumatic, normocephalic  EYES: nonreactive pupils   ENT: Moist mucous membranes  NECK: Supple, no JVD  HEART: S1, S2, tachycardic, no murmurs, rubs, or gallops  LUNGS: loud gurgling sounds, coarse breath sounds, no wheezes   ABDOMEN: Nontender, distended, tympanic on percussion  EXTREMITIES: 2+ peripheral pulses bilaterally. No clubbing, cyanosis, or edema  NERVOUS SYSTEM:  Sedated/intubated  SKIN: No rashes or lesions    LABS:                        15.0   7.74  )-----------( 432      ( 23 Aug 2024 07:47 )             49.2     Hgb Trend: 15.0<--, 14.9<--, 12.8<--, 14.0<--  08-23    128<L>  |  95<L>  |  38<H>  ----------------------------<  159<H>  4.7   |  11<L>  |  1.73<H>    Ca    7.7<L>      23 Aug 2024 07:47    TPro  5.6<L>  /  Alb  2.5<L>  /  TBili  1.6<H>  /  DBili  x   /  AST  36  /  ALT  37  /  AlkPhos  91  08-23    Creatinine Trend: 1.73<--, 1.21<--, 1.20<--, 1.16<--, 1.65<--, 1.77<--    Urinalysis Basic - ( 23 Aug 2024 07:47 )    Color: x / Appearance: x / SG: x / pH: x  Gluc: 159 mg/dL / Ketone: x  / Bili: x / Urobili: x   Blood: x / Protein: x / Nitrite: x   Leuk Esterase: x / RBC: x / WBC x   Sq Epi: x / Non Sq Epi: x / Bacteria: x    Venous Blood Gas:  08-23 @ 21:33  7.05/29/245/8/95.4  VBG Lactate: --    MICROBIOLOGY:     RADIOLOGY & ADDITIONAL TESTS:    ASSESSMENT  VIRAJ FOY is a 64y male with PMH of HTN, BPH, rectal adenocarcinoma on oxaliplatin and capecitabine (most recently 8/2/2024) who initially presented for diarrhea, poor PO intake, admitted for MICHAEL and hypotension 2/2 to dehydration from GI losses. Course c/b w/ metabolic acidosis and transferred to MICU s/p cardiac arrest likely secondary to aspiration.     PLAN  =====Neurologic=====  - baseline A&O x4  - currently intubated and sedated     =====Pulmonary=====  #AHRF  #Aspiration   - s/p cardiac arrest - Intubated, mechanical vent - pressure control, PIP 15, peep 5, Fio2 40%    =====Cardiovascular=====  #Cardiac arrest  #Shock   - PEA -> ROSC 18 mins  - secondary to aspiration  - continue levo, vaso, yennifer, wean for MAP >65     #Afib w/ RVR  - s/p unsucessful synchronized cardioversion x2 and amio 150x1   - started on amio gtt     =====GI=====    #Distended stomach   - f/u CT A/P   - OG tube to suction     #Diarrhea  - secondary to chemo   - GI PCR, Cdiff negative  - on home lomotil, sucralfate     - GI ppx: Protonix 40mg IV QD  - NPO except meds     =====Renal/=====  #Severe anion gap metabolic acidosis  #Lactic acidosis   - bicarb <10,  VBG 7.05, AG 23 from RRT   - Post Code w/ ABG- pH 6.93, lactate 9.7  - continue Bicarb drip 150cc/hr     #MICHAEL  - Likely caused by dehydration and chemo on admission, now worsened in setting of cardiac arrest/shock  - monitor UO   - avoid nephrotoxic agents, renally dose meds     #Electrolytes  - Maintain K>4, Phos>3, Mag>2, iCal>1    #BPH  - hold Finasteride    =====Endocrine=====  - no active issues     =====Infectious Disease=====  #Aspiration Pneumonitis   - Zosyn for empiric coverage (8/24- )   - Infectious workup: blood culture x2, sputum culture, urine culture, and UA   - trend WBC, fever curve     =====Heme/Onc=====  #Rectal CA  - follows w/ Dr. Caballero at [facility], on oxaliplatin + capecitabine   - Currently held chemo while in the hospital  - f/u onc recs     #DVT PPX  - SCD    =====Ethics=====  FULL CODE       Critical Care Attending Attestation:   64M Hx Upper Rectal Adenocarcinoma metastatic to Thoracolumbar Spine treated with CXT at [facility] with Cxt 8/2 in Weight Loss, Poor PO Intake, progressive NB Watery Diarrhea admitted to [facility] for Colitis/Gastroenteritis 8/19. RRT called this evening for sudden SOB/WOB with SpO2 80s, gurgling sound and placed on BiPAP for presumed flushed APE but CXR showed large fundal gas shadow with air fluid level, HAG-MA vpH =7.08, HCO3 = 8 and BiPAP DC and sent down for CT Abdomen /Pelvis. He completed the scan but suddenly became obtunded with PEA and Code Blue called in Radiology Suit. Multiple attempted intubation for massive aspiration required suction and RASS achieved x 18 Min and transferred immediately to MICU.   - Post Cardiac Arrest on Vent Support without any sedation upon arrival with HR 170s, SBP 80-90s   - Shock on 2 Pressors titration with urgent IO placement in ICU for adding 3rd Pressor   - Bedside POCUS, CVP/A-Lines, Xiao and OGT placed for wall suction   - Synchronized Cardioversion x 2 for A.Fib RVR 180s with SBP 70-80s without success   - IV HCO3 along with IV Amiodarone Bolus achieved stable HR Control   - Vent Setting titrated and adjusted to Pressure Control for SpO2 100%   - NPO, OGT Lavage after CXCR confirmation   - Empiric ABx Coverage for Aspiration Pneumonitis pending panculture   - F/U CT Abdomen/Pelvis and serial CBC, CMP, PT/INR, vph, Procalcitonin   - ARF/MICHAEL starting IV [id number] titration   - DVT PPx - SCD   - GOC - Full Code - Wife informed and updated clinical status         Patient seen and examined with ICU Resident/Fellow at bedside after lab data, medical records and radiology reports reviewed. I have read and agreeable in general with resident's Documented Note, Assessment and Management Plan which reflected my opinions from bedside round and discussion.   Total Critical Care Time = 45 Min excluding teaching and procedure activity.

## 2024-08-23 NOTE — PROGRESS NOTE ADULT - SUBJECTIVE AND OBJECTIVE BOX
Overnight events noted      VITAL:  T(C): , Max: 36.7 (08-23-24 @ 00:10)  T(F): , Max: 98.1 (08-23-24 @ 00:10)  HR: 117 (08-23-24 @ 08:00)  BP: 99/74 (08-23-24 @ 08:00)  RR: 17 (08-23-24 @ 08:00)  SpO2: 94% (08-23-24 @ 08:00)      PHYSICAL EXAM:  Constitutional: NAD, Alert  HEENT: NCAT, DMM  Neck: Supple, No JVD  Respiratory: CTA-b/l  Cardiovascular: tachy s1s2  Gastrointestinal: BS+, soft, NT/ND  Extremities: No peripheral edema b/l  Neurological: no focal deficits; strength grossly intact  Back: no CVAT b/l  Skin: No rashes, no nevi      LABS:                        15.0   7.74  )-----------( 432      ( 23 Aug 2024 07:47 )             49.2     Na(128)/K(4.7)/Cl(95)/HCO3(11)/BUN(38)/Cr(1.73)Glu(159)/Ca(7.7)/Mg(--)/PO4(--)    08-23 @ 07:47  Na(131)/K(4.1)/Cl(97)/HCO3(18)/BUN(33)/Cr(1.21)Glu(138)/Ca(7.9)/Mg(--)/PO4(--)    08-22 @ 09:55  Na(134)/K(4.4)/Cl(97)/HCO3(20)/BUN(34)/Cr(1.20)Glu(118)/Ca(8.3)/Mg(--)/PO4(--)    08-21 @ 07:28        IMPRESSION: 64M w/ HTN, HLD, and rectal CA on chemo, 8/19/24 p/w diarrhea, orthostatic hypotension, and MICHAEL    (1)Renal - prerenal - worsening again today    (2)Hyponatremia - hypovolemic hyponatremia in setting of diarrhea losses - worsening    (3)Metabolic acidosis - due to GI losses - worsening - we should place some HCO3 within the IVF    (4)CV - worsening hypovolemic - we need to increase the IVF      RECOMMEND:  (1)Change IVF from NS@75cc/h to 1/2NS+75meq/L NaHCO3@125cc/h  (2)Continue PO NaHCO3 as ordered  (3)BMP+Mg+PO4 q12h for now; further adjustment of IVF as needed based on repeat labwork  (4)Meds for GFR 30-40ml/min (present dosing is acceptable)      Julio Ohara MD  [facility]  Office/on call physician: [phone]  Cell (7a-7p): [phone]       (+)fatigue, (+)diarrhea      VITAL:  T(C): , Max: 36.7 (08-23-24 @ 00:10)  T(F): , Max: 98.1 (08-23-24 @ 00:10)  HR: 117 (08-23-24 @ 08:00)  BP: 99/74 (08-23-24 @ 08:00)  RR: 17 (08-23-24 @ 08:00)  SpO2: 94% (08-23-24 @ 08:00)      PHYSICAL EXAM:  Constitutional: lethargic but alert; frail  HEENT: NCAT, DMM  Neck: Supple, No JVD  Respiratory: CTA-b/l  Cardiovascular: tachy s1s2  Gastrointestinal: BS+, soft, NT/ND  Extremities: No peripheral edema b/l  Neurological: no focal deficits; strength grossly intact  Back: no CVAT b/l  Skin: No rashes, no nevi      LABS:                        15.0   7.74  )-----------( 432      ( 23 Aug 2024 07:47 )             49.2     Na(128)/K(4.7)/Cl(95)/HCO3(11)/BUN(38)/Cr(1.73)Glu(159)/Ca(7.7)/Mg(--)/PO4(--)    08-23 @ 07:47  Na(131)/K(4.1)/Cl(97)/HCO3(18)/BUN(33)/Cr(1.21)Glu(138)/Ca(7.9)/Mg(--)/PO4(--)    08-22 @ 09:55  Na(134)/K(4.4)/Cl(97)/HCO3(20)/BUN(34)/Cr(1.20)Glu(118)/Ca(8.3)/Mg(--)/PO4(--)    08-21 @ 07:28        IMPRESSION: 64M w/ HTN, HLD, and rectal CA on chemo, 8/19/24 p/w diarrhea, orthostatic hypotension, and MICHAEL    (1)Renal - prerenal - worsening again today    (2)Hyponatremia - hypovolemic hyponatremia in setting of diarrhea losses - worsening    (3)Metabolic acidosis - due to GI losses - worsening - we should place some HCO3 within the IVF    (4)CV - worsening hypovolemic - we need to increase the IVF      RECOMMEND:  (1)Change IVF from NS@75cc/h to 1/2NS+75meq/L NaHCO3@125cc/h  (2)Continue PO NaHCO3 as ordered  (3)BMP+Mg+PO4 q12h for now; further adjustment of IVF as needed based on repeat labwork  (4)Meds for GFR 30-40ml/min (present dosing is acceptable)      Julio Ohara MD  [facility]  Office/on call physician: [phone]  Cell (7a-7p): [phone]

## 2024-08-23 NOTE — PROVIDER CONTACT NOTE (OTHER) - ACTION/TREATMENT ORDERED:
NS 500ml bolus to be given and reassess the BP.
Provider will take a look into medication request. No intervention for RN at this time.

## 2024-08-23 NOTE — PROGRESS NOTE ADULT - ASSESSMENT
63 y/o male with HTN, BPH, upper rectal adenocarcinoma, under care of Dr. Marcello Caballero at [facility], presenting with worsening diarrhea, poor appetite and weight loss.    Rectal adenocarcinoma  - Follows w/ Dr. Caballero at [facility]; started treatment with oxaliplatin on 8/2/2024 + capecitabine. He received Venofer x 3, last on 8/16.   - He was last seen in the [facility] 8/14, where CT noted ileitis and colitis. He was given IVF, IV Pepcid, IV Zofran and IV Toradol and subsequently sent home. Stool studies were negative, empirically treated with azithromycin 500mg x 3 days. He started Lomotil on Saturday which initially helped but symptoms returned.   - Zeloda has been on hold since 8/15, continue holding.     MICHAEL, possible gastroenteritis likely medication induced  - Suspect ileitis and colitis due to chemotherapy  - Creatinine was 1.3 on 8/14 -> 1.7 on 8/19, now normal. Nephrology following, suspect prerenal azotemia w/ improvement after receiving IVF.   - Also found to have prolonged QTc, likely in the setting of antiemetics and azithromycin. Recommend cardiology evaluation, discontinue Zofran and start Reglan.   - Recommend GI evaluation given ongoing diarrhea  - PT follow up    Will continue to follow.     Sharath Flores PA-C  Hematology/Oncology  [facility]  [phone] (office)
64 male h/o htn, bph, rectal ca on chemo, here with diarrhea, sara, hyponatremia    symptoms likely 2/2 chemo  renal consult apprec  ivf as per renal  immodium prn  serial bmp    rectal ca  onc f/u  hold chemo while in hospital    bph  cont finestride    htn  hold bp meds for now in setting of dehydration    Advanced care planning was discussed with patient and family.  Advanced care planning forms were reviewed and discussed as appropriate.  Differential diagnosis and plan of care discussed with patient after the evaluation.   Pain assessed and judicious use of narcotics when appropriate was discussed.  Importance of Fall prevention discussed.  Counseling on Smoking and Alcohol cessation was offered when appropriate.  Counseling on Diet, exercise, and medication compliance was done.         
64 male h/o htn, bph, rectal ca on chemo, here with diarrhea, sara, hyponatremia    symptoms likely 2/2 chemo  renal consult apprec  ivf as per renal  serial bmp    diarrhea  non infectious  gi consult f/u  lomotil as per gi    hiccups  Thorazine as per gi    rectal ca  onc f/u  hold chemo while in hospital    bph  cont finestride    htn  hold bp meds for now in setting of dehydration    prolong qtc  cards f/u  daily ekg  avoid qt prolonging meds        Advanced care planning was discussed with patient and family.  Advanced care planning forms were reviewed and discussed as appropriate.  Differential diagnosis and plan of care discussed with patient after the evaluation.   Pain assessed and judicious use of narcotics when appropriate was discussed.  Importance of Fall prevention discussed.  Counseling on Smoking and Alcohol cessation was offered when appropriate.  Counseling on Diet, exercise, and medication compliance was done.         
64 male h/o htn, bph, rectal ca on chemo, here with diarrhea, sara, hyponatremia    symptoms likely 2/2 chemo  renal consult apprec  ivf as per renal  serial bmp  Na and creat improving    diarrhea  non infectious  gi consult f/u  lomotil as per gi    rectal ca  onc f/u  hold chemo while in hospital    bph  cont finestride    htn  hold bp meds for now in setting of dehydration    prolong qtc  cards f/u  daily ekg  avoid qt prolonging meds        Advanced care planning was discussed with patient and family.  Advanced care planning forms were reviewed and discussed as appropriate.  Differential diagnosis and plan of care discussed with patient after the evaluation.   Pain assessed and judicious use of narcotics when appropriate was discussed.  Importance of Fall prevention discussed.  Counseling on Smoking and Alcohol cessation was offered when appropriate.  Counseling on Diet, exercise, and medication compliance was done.         
65 y/o male with HTN, BPH, upper rectal adenocarcinoma, under care of Dr. Marcello Caballero at [facility], presenting with worsening diarrhea, poor appetite and weight loss.    Rectal adenocarcinoma  - Follows w/ Dr. Caballero at [facility]; started treatment with oxaliplatin on 8/2/2024 + capecitabine. He received Venofer x 3, last on 8/16.   - He was last seen in the [facility] 8/14, where CT noted ileitis and colitis. He was given IVF, IV Pepcid, IV Zofran and IV Toradol and subsequently sent home. Stool studies were negative, empirically treated with azithromycin 500mg x 3 days. He started Lomotil on Saturday which initially helped but symptoms returned.   - Zeloda has been on hold since 8/15, continue holding.     MICHAEL, possible gastroenteritis likely medication induced  - Suspect ileitis and colitis due to chemotherapy  - Creatinine was 1.3 on 8/14 -> 1.7 on 8/19, now normal. Nephrology following, suspect prerenal azotemia w/ improvement after receiving IVF.   - Also found to have prolonged QTc, likely in the setting of antiemetics and azithromycin. Recommend cardiology evaluation, discontinue Zofran and start Reglan.   - GI consulted, C diff/ GI PCR negative pt started on lomotil   - PT follow up    Will continue to follow.     Jaye Villareal NP  Hematology/ Oncology  [facility]  [phone] (office)  [phone] (alt office)  Evenings and weekends please call MD on call or office  
64 male h/o htn, bph, rectal ca on chemo, here with diarrhea, sara, hyponatremia    symptoms likely 2/2 chemo  renal consult apprec  ivf as per renal  immodium prn  serial bmp  f/u gi consult re persistent diarrhea    rectal ca  onc f/u  hold chemo while in hospital    bph  cont finestride    htn  hold bp meds for now in setting of dehydration    prolong qtc  dc zofran    daily ekg  cards f/u      Advanced care planning was discussed with patient and family.  Advanced care planning forms were reviewed and discussed as appropriate.  Differential diagnosis and plan of care discussed with patient after the evaluation.   Pain assessed and judicious use of narcotics when appropriate was discussed.  Importance of Fall prevention discussed.  Counseling on Smoking and Alcohol cessation was offered when appropriate.  Counseling on Diet, exercise, and medication compliance was done.         
65 y/o male with HTN, BPH, upper rectal adenocarcinoma, under care of Dr. Marcello Caballero at [facility], presenting with worsening diarrhea, poor appetite and weight loss.    Rectal adenocarcinoma  - Follows w/ Dr. Caballero at [facility]; started treatment with oxaliplatin on 8/2/2024 + capecitabine. He received Venofer x 3, last on 8/16.   - He was last seen in the [facility] 8/14, where CT noted ileitis and colitis. He was given IVF, IV Pepcid, IV Zofran and IV Toradol and subsequently sent home. Stool studies were negative, empirically treated with azithromycin 500mg x 3 days. He started Lomotil on Saturday which initially helped but symptoms returned.   - Zeloda has been on hold since 8/15, continue holding.     MICHAEL, possible gastroenteritis likely medication induced  - Creatinine was 1.3 on 8/14 -> 1.7 on 8/19, now normal. Nephrology following, suspect prerenal azotemia w/ improvement after receiving IVF.   - Also found to have prolonged QTc, likely in the setting of antiemetics and azithromycin.    Will continue to follow.     Sharath Flores PA-C  Hematology/Oncology  [facility]  [phone] (office)
63 y/o male with HTN, BPH, upper rectal adenocarcinoma, under care of Dr. Marcello Caballero at [facility] [place] [facility], presenting with worsening diarrhea, poor appetite and weight loss.    Rectal adenocarcinoma  - Follows w/ Dr. Caballero at [facility]; started treatment with oxaliplatin on 8/2/2024 + capecitabine. He received Venofer x 3, last on 8/16.   - He was last seen in the [facility] 8/14, where CT noted ileitis and colitis. He was given IVF, IV Pepcid, IV Zofran and IV Toradol and subsequently sent home. Stool studies were negative, empirically treated with azithromycin 500mg x 3 days. He started Lomotil on Saturday which initially helped but symptoms returned.   - Zeloda has been on hold since 8/15, continue holding.     MICHAEL, possible gastroenteritis likely medication induced  - Suspect ileitis and colitis due to chemotherapy  - Creatinine was 1.3 on 8/14 -> 1.7 on 8/19, now normal. Nephrology following, suspect prerenal azotemia w/ improvement after receiving IVF.   - Also found to have prolonged QTc, likely in the setting of antiemetics and azithromycin. Recommend cardiology evaluation, discontinue Zofran and start Reglan.   - GI consulted, C diff/ GI PCR negative pt started on lomotil   - Palliative care consult recommended for symptom management--> diarrhea, appetite stimulants    - PT follow up    Will continue to follow.     Jaye Villareal NP  Hematology/ Oncology  [facility]  [phone] (office)  [phone] (alt office)  Evenings and weekends please call MD on call or office  
hiccups  diarrhea  rectal adenoca    change to soft diet with ensure tid  c diff negative  gi pcr negative  suspect chemotherapy related  cont lomotil 2 tab po q6h  thorazine prn for hiccups  d/w patient  d/w wife over phone    I reviewed the overnight course of events on the unit, re-confirming the patient history. I discussed the care with the patient and their family  The plan of care was discussed with the physician assistant and modifications were made to the notation where appropriate.   Differential diagnosis and plan of care discussed with patient after the evaluation  35 minutes spent on total encounter of which more than fifty percent of the encounter was spent counseling and/or coordinating care by the attending physician.  Advanced care planning was discussed with patient and family.  Advanced care planning forms were reviewed and discussed.  Risks, benefits and alternatives of gastroenterologic procedures were discussed in detail and all questions were answered.  
64 male h/o htn, bph, rectal ca on chemo, here with diarrhea, sara, hyponatremia  abnormal EKG with prolonged qtc 543

## 2024-08-23 NOTE — PROGRESS NOTE ADULT - SUBJECTIVE AND OBJECTIVE BOX
KIMI FOY  64y Male  MRN:[id number]    Patient is a 64y old  Male who presents with a chief complaint of dehydration      HPI:  63 y/o male with HTN, BPH, upper rectal adenocarcinoma, under care of Dr. Marcello Caballero at [facility], started treatment with oxaliplatin on 8/2/2024 + capecitabine. He received Venofer x 3, last on 8/16. [facility] earlier due to worsening diarrhea (no blood reported), poor appetite and weight loss. He was last seen in the [facility] 8/14, where CT noted ileitis and colitis. He was given IVF, IV Pepcid, IV Zofran and IV Toradol and subsequently sent home. Stool studies were negative, but he was empirically treated with azithromycin 500mg x 3 days the following day. Last Friday he called to report dizziness and fall with head strike 4am. He  presented to a local ER where he received 2L IV hydration, antiemetics and pain medications. He started Lomotil on Saturday which initially helped but symptoms returned yesterday. Zeloda has been on hold since 8/15.    At urgent care earlier was found to have MICHAEL, gastroenteritis likely medication induced, FTT 2/2 nausea/vomiting and prolong QTc. Patient was +orthostatic on exam, s/p IV zofran and IV famotidine, Na 129 K 3.5 Mag 1.7, BUN/Cr 38/1.7  and s/p 1L IVF. Advised to come to [facility] for further management, IVF, cardiac monitoring, cardiac/GI evaluation. (19 Aug 2024 18:14)      Patient seen and evaluated at bedside. No acute events overnight except as noted.    Interval HPI: c/o diarrhea and hiccups     PAST MEDICAL & SURGICAL HISTORY:  Rectal cancer      HTN (hypertension)      High cholesterol      No significant past surgical history          REVIEW OF SYSTEMS:  as per hpi     VITALS:    Vital Signs Last 24 Hrs  T(C): 36.9 (23 Aug 2024 12:53), Max: 36.9 (23 Aug 2024 12:53)  T(F): 98.4 (23 Aug 2024 12:53), Max: 98.4 (23 Aug 2024 12:53)  HR: 120 (23 Aug 2024 12:53) (87 - 120)  BP: 85/60 (23 Aug 2024 12:53) (85/60 - 117/84)  BP(mean): --  RR: 20 (23 Aug 2024 12:53) (17 - 20)  SpO2: 93% (23 Aug 2024 12:53) (92% - 98%)    Parameters below as of 23 Aug 2024 12:53  Patient On (Oxygen Delivery Method): room air           PHYSICAL EXAM:  GENERAL: NAD, well-developed  HEAD:  Atraumatic, Normocephalic  EYES: EOMI, PERRLA, conjunctiva and sclera clear  NECK: Supple, No JVD  CHEST/LUNG: Clear to auscultation bilaterally; No wheeze  HEART: S1, S2; No murmurs, rubs, or gallops  ABDOMEN: Soft, Nontender, Nondistended; Bowel sounds present  EXTREMITIES:  2+ Peripheral Pulses, No clubbing, cyanosis, or edema  PSYCH: Normal affect  NEUROLOGY: AAOX3; non-focal  SKIN: No rashes or lesions    Consultant(s) Notes Reviewed:  [x ] YES  [ ] NO  Care Discussed with Consultants/Other Providers [ x] YES  [ ] NO    MEDS:   MEDICATIONS  (STANDING):  aspirin enteric coated 81 milliGRAM(s) Oral daily  chlorhexidine 2% Cloths 1 Application(s) Topical daily  diphenoxylate/atropine 2 Tablet(s) Oral every 6 hours  finasteride 5 milliGRAM(s) Oral daily  FIRST- Mouthwash  BLM 10 milliLiter(s) Swish and Spit five times a day  pantoprazole  Injectable 40 milliGRAM(s) IV Push daily  sodium bicarbonate 1300 milliGRAM(s) Oral two times a day  sodium bicarbonate  Infusion 0.133 mEq/kG/Hr (125 mL/Hr) IV Continuous <Continuous>  sucralfate suspension 1 Gram(s) Oral every 6 hours    MEDICATIONS  (PRN):  aluminum hydroxide/magnesium hydroxide/simethicone Suspension 30 milliLiter(s) Oral every 4 hours PRN Dyspepsia  chlorproMAZINE    IVPB 25 milliGRAM(s) IV Intermittent every 8 hours PRN hiccups  ondansetron Injectable 4 milliGRAM(s) IV Push every 6 hours PRN Nausea and/or Vomiting  oxyCODONE    IR 5 milliGRAM(s) Oral every 8 hours PRN Moderate Pain (4 - 6)      ALLERGIES:  No Known Allergies      LABS:                                  15.0   7.74  )-----------( 432      ( 23 Aug 2024 07:47 )             49.2   08-23    128<L>  |  95<L>  |  38<H>  ----------------------------<  159<H>  4.7   |  11<L>  |  1.73<H>    Ca    7.7<L>      23 Aug 2024 07:47    TPro  5.6<L>  /  Alb  2.5<L>  /  TBili  1.6<H>  /  DBili  x   /  AST  36  /  ALT  37  /  AlkPhos  91  08-23           < from: Xray Chest 1 View AP/PA (08.19.24 @ 15:30) >  IMPRESSION:  Clear lungs.    --- End of Report ---      < end of copied text >

## 2024-08-23 NOTE — PROGRESS NOTE ADULT - NS ATTEND AMEND GEN_ALL_CORE FT
ongoing diarrhea. will ask for GI consult. Cardio consult for qtc prolongation. hold zofran. will cont to follow
Agree with above assessment and plan.   Diarrhea likely related to Xeloda. Cdiff and GI PCR negative. Lomotil. Palliative for symptom control.  Hiccups improved, on thorazine. Appreciate GI consult. Gabapentin caused patient to be drowsy- discontinue.   Discussed with patient at bedside and wife over the phone   Due to the amount of GI losses need IV hydration- need to monitor the kidney function closely as bump in creaitnine.   Mouthwash for the oral ulcers  All above chemo induced which will take time to resolve and need to continue inpatient supportive care   Follow up at [facility] after discharge with Dr Marcello Caballero
Patient seen and examined with PA,   I agree with the above assessment and plan
Agree with above assessment and plan. Diarrhea likely related to Xeloda. Cdiff and GI PCR negative. Lomotil. Palliative for symptom control.   Hiccups- give a dose of baclofen and gabapentin 300mg qhs. GI consult.

## 2024-08-23 NOTE — PROVIDER CONTACT NOTE (OTHER) - ASSESSMENT
PT a&ox4, denies dizziness.
Patient is A&O*4 and Vital signs are stable. Patient is complaining of  more frequent burping and frequent episodes of diarrhea. Patient is also requesting to be started on lomotil an outside medication that he was on that he informs helped him.

## 2024-08-23 NOTE — PROGRESS NOTE ADULT - PROVIDER SPECIALTY LIST ADULT
Heme/Onc
Heme/Onc
Nephrology
Gastroenterology
Nephrology
Heme/Onc
Heme/Onc
Internal Medicine
Cardiology

## 2024-08-23 NOTE — PROGRESS NOTE ADULT - SUBJECTIVE AND OBJECTIVE BOX
Patient is a 64y old  Male who presents with a chief complaint of worsening diarrhea, poor appetite and weight loss.    No acute overnight events, pt seen and examined  Appears comfortable but more lethargic. Pt reports lack of sleep and exhaustion from ongoing diarrhea     MEDICATIONS  (STANDING):  aspirin enteric coated 81 milliGRAM(s) Oral daily  chlorhexidine 2% Cloths 1 Application(s) Topical daily  diphenoxylate/atropine 2 Tablet(s) Oral every 6 hours  finasteride 5 milliGRAM(s) Oral daily  FIRST- Mouthwash  BLM 10 milliLiter(s) Swish and Spit five times a day  gabapentin 300 milliGRAM(s) Oral at bedtime  pantoprazole  Injectable 40 milliGRAM(s) IV Push daily  sodium bicarbonate 1300 milliGRAM(s) Oral two times a day  sodium bicarbonate  Infusion 0.133 mEq/kG/Hr (125 mL/Hr) IV Continuous <Continuous>  sucralfate suspension 1 Gram(s) Oral every 6 hours    MEDICATIONS  (PRN):  aluminum hydroxide/magnesium hydroxide/simethicone Suspension 30 milliLiter(s) Oral every 4 hours PRN Dyspepsia  chlorproMAZINE    IVPB 25 milliGRAM(s) IV Intermittent every 8 hours PRN hiccups  ondansetron Injectable 4 milliGRAM(s) IV Push every 6 hours PRN Nausea and/or Vomiting  oxyCODONE    IR 5 milliGRAM(s) Oral every 8 hours PRN Moderate Pain (4 - 6)    Vital Signs Last 24 Hrs  T(C): 36.5 (23 Aug 2024 08:00), Max: 36.7 (23 Aug 2024 00:10)  T(F): 97.7 (23 Aug 2024 08:00), Max: 98.1 (23 Aug 2024 00:10)  HR: 117 (23 Aug 2024 08:00) (87 - 118)  BP: 99/74 (23 Aug 2024 08:00) (99/74 - 117/84)  BP(mean): --  RR: 17 (23 Aug 2024 08:00) (17 - 18)  SpO2: 94% (23 Aug 2024 08:00) (92% - 98%)    Parameters below as of 23 Aug 2024 08:00  Patient On (Oxygen Delivery Method): room air      PE  Awake, alert  Anicteric, MMM  RRR  CTAB  Abd soft, NT, ND  No c/c                        15.0   7.74  )-----------( 432      ( 23 Aug 2024 07:47 )             49.2       08-23    128<L>  |  95<L>  |  38<H>  ----------------------------<  159<H>  4.7   |  11<L>  |  1.73<H>    Ca    7.7<L>      23 Aug 2024 07:47    TPro  5.6<L>  /  Alb  2.5<L>  /  TBili  1.6<H>  /  DBili  x   /  AST  36  /  ALT  37  /  AlkPhos  91  08-23

## 2024-08-23 NOTE — CHART NOTE - NSCHARTNOTEFT_GEN_A_CORE
MEDICINE NP NOTE    Event Summary:   Notified by RN RRT called for hypoxia.    Patient is A&O x 4    >VITAL SIGNS:  T(C): 36.4 (08-23-24 @ 17:24), Max: 36.9 (08-23-24 @ 12:53)  T(F): 97.6 (08-23-24 @ 17:24), Max: 98.4 (08-23-24 @ 12:53)  HR: 115 (08-23-24 @ 17:24) (87 - 120)  BP: 100/62 (08-23-24 @ 18:00) (85/60 - 128/74)  RR: 18 (08-23-24 @ 17:24) (17 - 20)  SpO2: 95% (08-23-24 @ 17:24) (92% - 95%)      >Review Of Systems:   CONSTITUTIONAL: No fever, weight loss, or fatigue  EYES: No eye pain, visual disturbances, or discharge  ENMT:  No difficulty hearing, tinnitus, vertigo; No sinus or throat pain  NECK: No pain or stiffness  BREASTS: No pain, masses, or nipple discharge  RESPIRATORY: No cough, wheezing, chills or hemoptysis; No shortness of breath  CARDIOVASCULAR: No chest pain, palpitations, dizziness, or leg swelling  GASTROINTESTINAL: No abdominal or epigastric pain. No nausea, vomiting, or hematemesis; No diarrhea or constipation. No melena or hematochezia.  GENITOURINARY: No dysuria, frequency, hematuria, or incontinence  NEUROLOGICAL: No headaches, memory loss, loss of strength, numbness, or tremors  SKIN: No itching, burning, rashes, or lesions   LYMPH NODES: No enlarged glands  ENDOCRINE: No heat or cold intolerance; No hair loss  MUSCULOSKELETAL: No joint pain or swelling; No muscle, back, or extremity pain  PSYCHIATRIC: No depression, anxiety, mood swings, or difficulty sleeping  HEME/LYMPH: No easy bruising, or bleeding gums  ALLERY AND IMMUNOLOGIC: No hives or eczema    >LABORATORY:                          15.0   7.74  )-----------( 432      ( 23 Aug 2024 07:47 )             49.2       08-23    128<L>  |  95<L>  |  38<H>  ----------------------------<  159<H>  4.7   |  11<L>  |  1.73<H>    Ca    7.7<L>      23 Aug 2024 07:47    TPro  5.6<L>  /  Alb  2.5<L>  /  TBili  1.6<H>  /  DBili  x   /  AST  36  /  ALT  37  /  AlkPhos  91  08-23 >ASSESSMENT/PLAN:   RRT called for hypoxia. RRT responded and stabilized pt. See RRT note for full details. During RRT pt evaluated by MICU, pt accepted to MICU for further management.  Pt transferred to MICU by RRT team.   Dr. Chavez updated on pt's condition.   Mirta Gibson wife ([phone]) updated on plan of care.          Emelin Reyes [place],    Medicine Department   [facility] [id number]

## 2024-08-24 NOTE — PROCEDURE NOTE - NSPROCDETAILS_GEN_ALL_CORE
guidewire recovered/lumen(s) aspirated and flushed/sterile dressing applied/sterile technique, catheter placed/ultrasound guidance with use of sterile gel and probe cove
location identified, draped/prepped, sterile technique used, needle inserted/introduced/positive blood return obtained via catheter/connected to a pressurized flush line/sutured in place/hemostasis with direct pressure, dressing applied/Seldinger technique/all materials/supplies accounted for at end of procedure
location identified, draped/prepped, sterile technique used/lumen(s) aspirated and flushed/sterile dressing applied
orogastric/gastric secretions aspirated, placement confirmed

## 2024-08-24 NOTE — PROCEDURE NOTE - NSPROCNAME_GEN_A_CORE
Arterial Puncture/Cannulation
Central Line Insertion
Gastric Intubation/Gastric Lavage
Intraosseous Infusion

## 2024-08-24 NOTE — CONSULT NOTE ADULT - CONSULT REASON
Rectal adenocarcinoma
Azotemia/Hyponatremia
diarrhea  hiccups
LBO
diarrhea
Abnormal EKG   Prolonged Qtc

## 2024-08-24 NOTE — CONSULT NOTE ADULT - SUBJECTIVE AND OBJECTIVE BOX
COLORECTAL SURGERY CONSULT NOTE  HPI: 65 y/o male with HTN, BPH, upper rectal adenocarcinoma, [place] Marcello [place]all[place] [facility] [place] treatment with oxaliplatin on 8/2/2024 + capecitabine. He received Venofer x 3, last on 8/16. [place] [facility] [place] due to worsening diarrhea (no blood reported), poor appetite and weight loss. He was last seen in the [facility] 8/14, where CT noted ileitis and colitis. He was given IVF, IV Pepcid, IV Zofran and IV Toradol and subsequently sent home. Stool studies were negative, but he was empirically treated with azithromycin 500mg x 3 days the following day. Last Friday he called to report dizziness and fall with head strike 4am. He  presented to a local ER where he received 2L IV hydration, antiemetics and pain medications. He started Lomotil on Saturday which initially helped but symptoms returned yesterday. Zeloda has been on hold since 8/15.    At urgent care earlier was found to have MICHAEL, gastroenteritis likely medication induced, FTT 2/2 nausea/vomiting and prolong QTc. Patient was +orthostatic on exam, s/p IV zofran and IV famotidine, Na 129 K 3.5 Mag 1.7, BUN/Cr 38/1.7  and s/p 1L IVF. Advised to come to [facility] for further management, IVF, cardiac monitoring, cardiac/GI evaluation. (19 Aug 2024 18:14)    RRT called earlier today for hypoxia and increased work of breathing.  Patient aspirated while laying flat for CT and code blue initiated,  Patient no in MICU, Surgery consulted for prelim CT read noting high grade LBO.       PAST MEDICAL & SURGICAL HISTORY:  Rectal cancer      HTN (hypertension)      High cholesterol      No significant past surgical history          MEDICATIONS  (STANDING):  albuterol/ipratropium for Nebulization. 3 milliLiter(s) Nebulizer once  aMIOdarone Infusion 1 mG/Min (33.3 mL/Hr) IV Continuous <Continuous>  aMIOdarone Infusion 0.5 mG/Min (16.7 mL/Hr) IV Continuous <Continuous>  chlorhexidine 0.12% Liquid 15 milliLiter(s) Oral Mucosa every 12 hours  chlorhexidine 2% Cloths 1 Application(s) Topical daily  dexMEDEtomidine Infusion 0.4 MICROgram(s)/kG/Hr (7.03 mL/Hr) IV Continuous <Continuous>  diphenoxylate/atropine 2 Tablet(s) Oral every 6 hours  fentaNYL   Infusion.. 1 MICROgram(s)/kG/Hr (3.52 mL/Hr) IV Continuous <Continuous>  norepinephrine Infusion 0.05 MICROgram(s)/kG/Min (3.3 mL/Hr) IV Continuous <Continuous>  pantoprazole  Injectable 40 milliGRAM(s) IV Push every 12 hours  phenylephrine    Infusion 0.1 MICROgram(s)/kG/Min (1.32 mL/Hr) IV Continuous <Continuous>  piperacillin/tazobactam IVPB.- 3.375 Gram(s) IV Intermittent once  piperacillin/tazobactam IVPB.. 3.375 Gram(s) IV Intermittent every 8 hours  sodium bicarbonate  Infusion 0.32 mEq/kG/Hr (150 mL/Hr) IV Continuous <Continuous>  sucralfate suspension 1 Gram(s) Oral every 6 hours  vasopressin Infusion 0.04 Unit(s)/Min (6 mL/Hr) IV Continuous <Continuous>    MEDICATIONS  (PRN):      Allergies    No Known Allergies    Intolerances        SOCIAL HISTORY: Social History:      FAMILY HISTORY:          Physical Exam:  General: NAD, resting comfortably  HEENT: NC/AT, EOMI, normal hearing, no oral lesions, no LAD, neck supple  Pulmonary: normal resp effort, CTA-B  Cardiovascular: NSR, no murmurs  Abdominal: soft, ND/NT, no organomegaly  Extremities: WWP, normal strength, no clubbing/cyanosis/edema  Neuro: A/O x 3, CNs II-XII grossly intact, normal sensation, no focal deficits  Pulses: palpable distal pulses    Vital Signs Last 24 Hrs  T(C): 35.9 (24 Aug 2024 04:00), Max: 36.9 (23 Aug 2024 12:53)  T(F): 96.6 (24 Aug 2024 04:00), Max: 98.4 (23 Aug 2024 12:53)  HR: 80 (24 Aug 2024 05:15) (80 - 180)  BP: 80/59 (24 Aug 2024 00:00) (80/59 - 139/91)  BP(mean): 65 (24 Aug 2024 00:00) (64 - 111)  RR: 23 (24 Aug 2024 05:15) (10 - 25)  SpO2: 94% (24 Aug 2024 05:15) (89% - 100%)    Parameters below as of 24 Aug 2024 00:00  Patient On (Oxygen Delivery Method): ventilator    O2 Concentration (%): 50    I&O's Summary    22 Aug 2024 07:01  -  23 Aug 2024 07:00  --------------------------------------------------------  IN: 1050 mL / OUT: 0 mL / NET: 1050 mL    23 Aug 2024 07:01  -  24 Aug 2024 05:35  --------------------------------------------------------  IN: 3053 mL / OUT: 450 mL / NET: 2603 mL            LABS:                        12.0   6.53  )-----------( 306      ( 24 Aug 2024 03:11 )             40.1     08-24    133<L>  |  100  |  48<H>  ----------------------------<  256<H>  6.7<HH>   |  10<LL>  |  2.59<H>    Ca    7.5<L>      24 Aug 2024 03:11  Phos  11.6     08-24  Mg     3.8     08-24    TPro  3.3<L>  /  Alb  1.8<L>  /  TBili  2.1<H>  /  DBili  x   /  AST  64<H>  /  ALT  39  /  AlkPhos  75  08-24    PT/INR - ( 23 Aug 2024 23:27 )   PT: 61.7 sec;   INR: 5.91 ratio         PTT - ( 23 Aug 2024 23:27 )  PTT:76.1 sec  Urinalysis Basic - ( 24 Aug 2024 03:11 )    Color: Dark Yellow / Appearance: Cloudy / SG: >1.030 / pH: x  Gluc: 256 mg/dL / Ketone: Trace mg/dL  / Bili: Small / Urobili: 0.2 mg/dL   Blood: x / Protein: 30 mg/dL / Nitrite: Positive   Leuk Esterase: Trace / RBC: 45 /HPF / WBC 3 /HPF   Sq Epi: x / Non Sq Epi: 8 /HPF / Bacteria: Negative /HPF      CAPILLARY BLOOD GLUCOSE      POCT Blood Glucose.: 163 mg/dL (24 Aug 2024 03:59)  POCT Blood Glucose.: 154 mg/dL (23 Aug 2024 20:45)    LIVER FUNCTIONS - ( 24 Aug 2024 03:11 )  Alb: 1.8 g/dL / Pro: 3.3 g/dL / ALK PHOS: 75 U/L / ALT: 39 U/L / AST: 64 U/L / GGT: x             RADIOLOGY & ADDITIONAL STUDIES:  < from: CT Abdomen and Pelvis w/ IV Cont (08.23.24 @ 23:23) >    Preliminary  impression:  Motion limited.    No pulmonary embolism.    Tree-in-bud opacities of the right upper lobe, likely infectious. Likely   additional opacities in the right lower lobe, limited by motion.    Diffusely dilated loops of large and small bowel extending to the   enhancing rectosigmoid mass in the pelvis consistent with high-grade   large bowel obstruction.    Distended fluid-filled esophagus extending up through the cervical   esophagus, concerning for aspiration given lung findings.    Follow up final report in the morning.    Findings were discussed with LUIS ALFREDO Muhammad 8/24/2024 4:20 AM by Dr. Scot Porter with read back confirmation.        ******PRELIMINARY REPORT******        < end of copied text >   COLORECTAL SURGERY CONSULT NOTE  HPI: 65 y/o male with HTN, BPH, upper rectal adenocarcinoma, under care of Dr. Marcello Caballero [place] [facility] [place], started treatment with oxaliplatin on 8/2/2024 + capecitabine. He received Venofer x 3, last on 8/16. He presented to [facility] [place] earlier due to worsening diarrhea (no blood reported), poor appetite and weight loss. He was last seen in the [facility] 8/14, where CT noted ileitis and colitis. He was given IVF, IV Pepcid, IV Zofran and IV Toradol and subsequently sent home. Stool studies were negative, but he was empirically treated with azithromycin 500mg x 3 days the following day. Last Friday he called to report dizziness and fall with head strike 4am. He  presented to a local ER where he received 2L IV hydration, antiemetics and pain medications. He started Lomotil on Saturday which initially helped but symptoms returned yesterday. Zeloda has been on hold since 8/15.    At urgent care earlier was found to have MICHAEL, gastroenteritis likely medication induced, FTT 2/2 nausea/vomiting and prolong QTc. Patient was +orthostatic on exam, s/p IV zofran and IV famotidine, Na 129 K 3.5 Mag 1.7, BUN/Cr 38/1.7  and s/p 1L IVF. Advised to come to [facility] for further management, IVF, cardiac monitoring, cardiac/GI evaluation. (19 Aug 2024 18:14)    RRT called earlier today for hypoxia and increased work of breathing after suspected aspiration.  Patient aspirated again while laying flat for CT and code blue initiated,  Patient no in MICU, Surgery consulted for prelim CT read noting high grade LBO.       PAST MEDICAL & SURGICAL HISTORY:  Rectal cancer      HTN (hypertension)      High cholesterol      No significant past surgical history          MEDICATIONS  (STANDING):  albuterol/ipratropium for Nebulization. 3 milliLiter(s) Nebulizer once  aMIOdarone Infusion 1 mG/Min (33.3 mL/Hr) IV Continuous <Continuous>  aMIOdarone Infusion 0.5 mG/Min (16.7 mL/Hr) IV Continuous <Continuous>  chlorhexidine 0.12% Liquid 15 milliLiter(s) Oral Mucosa every 12 hours  chlorhexidine 2% Cloths 1 Application(s) Topical daily  dexMEDEtomidine Infusion 0.4 MICROgram(s)/kG/Hr (7.03 mL/Hr) IV Continuous <Continuous>  diphenoxylate/atropine 2 Tablet(s) Oral every 6 hours  fentaNYL   Infusion.. 1 MICROgram(s)/kG/Hr (3.52 mL/Hr) IV Continuous <Continuous>  norepinephrine Infusion 0.05 MICROgram(s)/kG/Min (3.3 mL/Hr) IV Continuous <Continuous>  pantoprazole  Injectable 40 milliGRAM(s) IV Push every 12 hours  phenylephrine    Infusion 0.1 MICROgram(s)/kG/Min (1.32 mL/Hr) IV Continuous <Continuous>  piperacillin/tazobactam IVPB.- 3.375 Gram(s) IV Intermittent once  piperacillin/tazobactam IVPB.. 3.375 Gram(s) IV Intermittent every 8 hours  sodium bicarbonate  Infusion 0.32 mEq/kG/Hr (150 mL/Hr) IV Continuous <Continuous>  sucralfate suspension 1 Gram(s) Oral every 6 hours  vasopressin Infusion 0.04 Unit(s)/Min (6 mL/Hr) IV Continuous <Continuous>    MEDICATIONS  (PRN):      Allergies    No Known Allergies    Intolerances        SOCIAL HISTORY: Social History:      FAMILY HISTORY:          Physical Exam:  General: NAD, resting comfortably  HEENT: NC/AT, EOMI, normal hearing, no oral lesions, no LAD, neck supple  Pulmonary: normal resp effort, CTA-B  Cardiovascular: NSR, no murmurs  Abdominal: soft, ND/NT, no organomegaly  Extremities: WWP, normal strength, no clubbing/cyanosis/edema  Neuro: A/O x 3, CNs II-XII grossly intact, normal sensation, no focal deficits  Pulses: palpable distal pulses    Vital Signs Last 24 Hrs  T(C): 35.9 (24 Aug 2024 04:00), Max: 36.9 (23 Aug 2024 12:53)  T(F): 96.6 (24 Aug 2024 04:00), Max: 98.4 (23 Aug 2024 12:53)  HR: 80 (24 Aug 2024 05:15) (80 - 180)  BP: 80/59 (24 Aug 2024 00:00) (80/59 - 139/91)  BP(mean): 65 (24 Aug 2024 00:00) (64 - 111)  RR: 23 (24 Aug 2024 05:15) (10 - 25)  SpO2: 94% (24 Aug 2024 05:15) (89% - 100%)    Parameters below as of 24 Aug 2024 00:00  Patient On (Oxygen Delivery Method): ventilator    O2 Concentration (%): 50    I&O's Summary    22 Aug 2024 07:01  -  23 Aug 2024 07:00  --------------------------------------------------------  IN: 1050 mL / OUT: 0 mL / NET: 1050 mL    23 Aug 2024 07:01  -  24 Aug 2024 05:35  --------------------------------------------------------  IN: 3053 mL / OUT: 450 mL / NET: 2603 mL            LABS:                        12.0   6.53  )-----------( 306      ( 24 Aug 2024 03:11 )             40.1     08-24    133<L>  |  100  |  48<H>  ----------------------------<  256<H>  6.7<HH>   |  10<LL>  |  2.59<H>    Ca    7.5<L>      24 Aug 2024 03:11  Phos  11.6     08-24  Mg     3.8     08-24    TPro  3.3<L>  /  Alb  1.8<L>  /  TBili  2.1<H>  /  DBili  x   /  AST  64<H>  /  ALT  39  /  AlkPhos  75  08-24    PT/INR - ( 23 Aug 2024 23:27 )   PT: 61.7 sec;   INR: 5.91 ratio         PTT - ( 23 Aug 2024 23:27 )  PTT:76.1 sec  Urinalysis Basic - ( 24 Aug 2024 03:11 )    Color: Dark Yellow / Appearance: Cloudy / SG: >1.030 / pH: x  Gluc: 256 mg/dL / Ketone: Trace mg/dL  / Bili: Small / Urobili: 0.2 mg/dL   Blood: x / Protein: 30 mg/dL / Nitrite: Positive   Leuk Esterase: Trace / RBC: 45 /HPF / WBC 3 /HPF   Sq Epi: x / Non Sq Epi: 8 /HPF / Bacteria: Negative /HPF      CAPILLARY BLOOD GLUCOSE      POCT Blood Glucose.: 163 mg/dL (24 Aug 2024 03:59)  POCT Blood Glucose.: 154 mg/dL (23 Aug 2024 20:45)    LIVER FUNCTIONS - ( 24 Aug 2024 03:11 )  Alb: 1.8 g/dL / Pro: 3.3 g/dL / ALK PHOS: 75 U/L / ALT: 39 U/L / AST: 64 U/L / GGT: x             RADIOLOGY & ADDITIONAL STUDIES:  < from: CT Abdomen and Pelvis w/ IV Cont (08.23.24 @ 23:23) >    Preliminary  impression:  Motion limited.    No pulmonary embolism.    Tree-in-bud opacities of the right upper lobe, likely infectious. Likely   additional opacities in the right lower lobe, limited by motion.    Diffusely dilated loops of large and small bowel extending to the   enhancing rectosigmoid mass in the pelvis consistent with high-grade   large bowel obstruction.    Distended fluid-filled esophagus extending up through the cervical   esophagus, concerning for aspiration given lung findings.    Follow up final report in the morning.    Findings were discussed with LUIS ALFREDO Muhammad 8/24/2024 4:20 AM by Dr. Scot Porter with read back confirmation.        ******PRELIMINARY REPORT******        < end of copied text >

## 2024-08-24 NOTE — DISCHARGE NOTE FOR THE EXPIRED PATIENT - HOSPITAL COURSE
This is a 65 y/o M w/ PMHx HTN, BPH, rectal adenocarcinoma on oxaliplatin and capecitabine (most recently 8/2/2024) who initially presented for diarrhea, poor PO intake, admitted for MICHAEL and hypotension 2/2 to dehydration from GI losses. Course c/b w/ metabolic acidosis and transferred to MICU s/p cardiac arrest on 8/23 secondary to aspiration. ROSC achieved 18 mins. On CT A/P patient was found to have high grade large bowel obstruction and started on OG tube suction.   Patient was transferred to the MICU post cardiac arrest. Patient continued to deteriorate despite maximum pressor support and ultimately went into cardiopulmonary arrest again on 8/24. Code blue initiated. Time of death 5:50AM.

## 2024-08-24 NOTE — CONSULT NOTE ADULT - CONSULT REQUESTED DATE/TIME
19-Aug-2024 17:49
22-Aug-2024 10:37
19-Aug-2024 13:08
21-Aug-2024 14:32
24-Aug-2024 05:34
22-Aug-2024 09:17

## 2024-08-24 NOTE — CONSULT NOTE ADULT - ASSESSMENT
ASSESSMENT/RECOMMENDATIONS:  65 y/o male with HTN, BPH, upper rectal adenocarcinoma, [place]cello [place]all[place] [facility] [place] treatment with oxaliplatin on 8/2/2024 + capecitabine admitted for chemo-related gastroenteritis and MICHAEL. Hospital course complicated by Code Blue 2/2 aspiration while in CT scanner with ROSC after 18 mins s/p epi x5, bicarb x 3 amps and 1g calcium chloride. Surgery consulted for high grade LBO seen on prelim CT read. Mass noted in rectosigmoid with incompetent ileocecal valve given small bowel, stomach, and esophagus also noted to be dilated. No pneumoperitoneum noted. Patient on 5 norepinephrine, 10 phenylephrine, 0.04 of vaso at time of evaluation with BP 74/55 on monitor. Abdomen softly distended on exam. Labs notable for severe metabolic acidosis derangements, including bicarbonate 10, hyperkalemia 6.7, lactate 10.6 (from 8.1 from 9.7).Given location of mass would recommend GI consult for stenting prior to any operative intervention, however, patient in severe shock and  unable to tolerate any operative intervention at this time.     - No surgical intervention to be offered at this time as patient   - If stabilizes, would recommend GI for possible stent prior to any surgical intervention  - Agree with NGT/OGT decompression  - Care per MICU    Patient discussed with attending, Dr Degroot.    Red Team Surgery  [id number]   ASSESSMENT/RECOMMENDATIONS:  65 y/o male with HTN, BPH, upper rectal adenocarcinoma, under care of Dr. Marcello Caballero [place] [facility] [place], started treatment with oxaliplatin on 8/2/2024 + capecitabine admitted for chemo-related gastroenteritis and MICHAEL. Hospital course complicated by Code Blue 2/2 aspiration while in CT scanner with ROSC after 18 mins s/p epi x5, bicarb x 3 amps and 1g calcium chloride. Surgery consulted for high grade LBO seen on prelim CT read. Mass noted in rectosigmoid with incompetent ileocecal valve given small bowel, stomach, and esophagus also noted to be dilated. No pneumoperitoneum noted. Patient on 5 norepinephrine, 10 phenylephrine, 0.04 of vaso at time of evaluation with BP 74/55 on monitor. Abdomen softly distended on exam. Labs notable for severe metabolic acidosis derangements, including bicarbonate 10, hyperkalemia 6.7, lactate 10.6 (from 8.1 from 9.7). Given location of mass would recommend GI consult for stenting prior to any operative intervention, however, patient in severe shock and likely unable to tolerate any intervention at this time.     - No surgical intervention to be offered at this time as patient   - If stabilizes, would recommend GI for possible stent prior to any surgical intervention  - Agree with NGT/OGT decompression  - Consider palliative consultation    Patient discussed with attending, Dr Degroot.    Red Team Surgery  [id number]

## 2024-08-24 NOTE — CHART NOTE - NSCHARTNOTEFT_GEN_A_CORE
: Aminah Toribio PA-C    INDICATION: s/p cardiac arrest    PROCEDURE:  [ X ] LIMITED ECHO  [ X ] LIMITED CHEST  [ ] LIMITED RETROPERITONEAL  [ X ] LIMITED ABDOMINAL  [ ] LIMITED DVT  [ ] NEEDLE GUIDANCE VASCULAR  [ ] NEEDLE GUIDANCE THORACENTESIS  [ ] NEEDLE GUIDANCE PARACENTESIS  [ ] NEEDLE GUIDANCE PERICARDIOCENTESIS  [ ] OTHER    FINDINGS:  Lungs:   A-line predominant anterior lung fields b/l  bibasilar b-lines, no pleural effusions    Heart:   difficult views, hyperdynamic cardiac function  IVC 1cm, collapsing with respirations      INTERPRETATION: : Aminah Toribio PA-C    INDICATION: s/p cardiac arrest    PROCEDURE:  [ X ] LIMITED ECHO  [ X ] LIMITED CHEST  [ ] LIMITED RETROPERITONEAL  [ X ] LIMITED ABDOMINAL  [ ] LIMITED DVT  [ ] NEEDLE GUIDANCE VASCULAR  [ ] NEEDLE GUIDANCE THORACENTESIS  [ ] NEEDLE GUIDANCE PARACENTESIS  [ ] NEEDLE GUIDANCE PERICARDIOCENTESIS  [ ] OTHER    FINDINGS:  Lungs:   A-line predominant anterior lung fields b/l  bibasilar b-lines, no pleural effusions    Heart:   difficult views, hyperdynamic cardiac function  IVC 1cm, collapsing with respirations    INTERPRETATION:  hyperdynamic cardiac function, collapsible IVC; will continue further IVF resuscitation

## 2024-08-24 NOTE — CHART NOTE - NSCHARTNOTEFT_GEN_A_CORE
DEATH NOTE    Called to bedside to evaluate the patient for asystole.     On physical exam, patient did not respond to verbal or noxious stimuli.  No spontaneous respirations.  Absent heart and breath sounds. Absent radial and carotid pulses. Pupils are fixed and dilated, no corneal reflex.  EKG rhythm strip shows asystole.  Patient pronounced dead at 8/24/2024 5:50AM. Attending notified. Family declined autopsy.    Waleska Ruth   Internal Medicine PGY-2 DEATH NOTE    Called to bedside to evaluate the patient for asystole. Code blue called, chest compressions initiated. Epi x 4, bicarb x3 given.     On physical exam, patient did not respond to verbal or noxious stimuli.  No spontaneous respirations.  Absent heart and breath sounds. Absent radial and carotid pulses. Pupils are fixed and dilated, no corneal reflex.  EKG rhythm strip shows asystole.  Patient pronounced dead at 8/24/2024 5:50AM. Attending notified. Family declined autopsy.    Waleska Ruth   Internal Medicine PGY-2

## 2024-08-24 NOTE — CONSULT NOTE ADULT - TIME BILLING
Advanced care planning was discussed with patient and family.  Advanced care planning forms were reviewed and discussed as appropriate.  Differential diagnosis and plan of care discussed with patient after the evaluation.   Pain assessed and judicious use of narcotics when appropriate was discussed.  Importance of Fall prevention discussed.  Counseling on Smoking and Alcohol cessation was offered when appropriate.  Counseling on Diet, exercise, and medication compliance was done.
Evaluation, chart review, care coordination, treatment planning, treatment.

## 2024-08-25 LAB
CULTURE RESULTS: NO GROWTH — SIGNIFICANT CHANGE UP
SPECIMEN SOURCE: SIGNIFICANT CHANGE UP

## 2024-08-26 LAB
-  AMPICILLIN: SIGNIFICANT CHANGE UP
-  DAPTOMYCIN: SIGNIFICANT CHANGE UP
-  GENTAMICIN SYNERGY: SIGNIFICANT CHANGE UP
-  LINEZOLID: SIGNIFICANT CHANGE UP
-  STREPTOMYCIN SYNERGY: SIGNIFICANT CHANGE UP
-  VANCOMYCIN: SIGNIFICANT CHANGE UP
CULTURE RESULTS: ABNORMAL
CULTURE RESULTS: ABNORMAL
METHOD TYPE: SIGNIFICANT CHANGE UP
ORGANISM # SPEC MICROSCOPIC CNT: ABNORMAL
SPECIMEN SOURCE: SIGNIFICANT CHANGE UP
SPECIMEN SOURCE: SIGNIFICANT CHANGE UP

## 2024-12-12 NOTE — ED PROVIDER NOTE - NS ED MD TWO NIGHTS YN
[Change in Activity] : no change in activity [Fever Above 102] : no fever [Malaise] : no malaise [Rash] : no rash [NI] : Endocrine [Nl] : Hematologic/Lymphatic Yes